# Patient Record
Sex: FEMALE | Race: WHITE | NOT HISPANIC OR LATINO | Employment: OTHER | URBAN - METROPOLITAN AREA
[De-identification: names, ages, dates, MRNs, and addresses within clinical notes are randomized per-mention and may not be internally consistent; named-entity substitution may affect disease eponyms.]

---

## 2019-03-14 PROBLEM — C50.411: Status: ACTIVE | Noted: 2019-03-14

## 2019-10-08 DIAGNOSIS — E11.42 DIABETIC POLYNEUROPATHY ASSOCIATED WITH TYPE 2 DIABETES MELLITUS: Primary | ICD-10-CM

## 2019-10-08 RX ORDER — INSULIN GLARGINE 100 [IU]/ML
65 INJECTION, SOLUTION SUBCUTANEOUS NIGHTLY
Qty: 2 ML | Refills: 1 | Status: SHIPPED | OUTPATIENT
Start: 2019-10-08 | End: 2019-10-14 | Stop reason: SDUPTHER

## 2019-10-08 NOTE — TELEPHONE ENCOUNTER
----- Message from Danny Castaneda sent at 10/8/2019 10:57 AM CDT -----  Refills lantus   [facility]   Pt [phone]

## 2019-10-14 DIAGNOSIS — E11.42 DIABETIC POLYNEUROPATHY ASSOCIATED WITH TYPE 2 DIABETES MELLITUS: ICD-10-CM

## 2019-10-14 RX ORDER — INSULIN GLARGINE 100 [IU]/ML
65 INJECTION, SOLUTION SUBCUTANEOUS NIGHTLY
Qty: 5850 ML | Refills: 1 | Status: SHIPPED | OUTPATIENT
Start: 2019-10-14 | End: 2019-10-14 | Stop reason: SDUPTHER

## 2019-10-14 RX ORDER — INSULIN GLARGINE 100 [IU]/ML
65 INJECTION, SOLUTION SUBCUTANEOUS NIGHTLY
Qty: 5850 ML | Refills: 1 | Status: SHIPPED | OUTPATIENT
Start: 2019-10-14 | End: 2020-06-29 | Stop reason: SDUPTHER

## 2019-10-14 NOTE — TELEPHONE ENCOUNTER
----- Message from Efraín Monroy sent at 10/14/2019  9:01 AM CDT -----  Contact: Maria T Turpin 100 UNIT/ML Solution Pen Injector  Send to [facility]   Pt# [phone]  Patient says that she called [facility] and they told her that they haven't received the prescription yet that was sent over last week .

## 2019-11-13 ENCOUNTER — OFFICE VISIT (OUTPATIENT)
Dept: FAMILY MEDICINE | Facility: CLINIC | Age: 74
End: 2019-11-13
Payer: MEDICARE

## 2019-11-13 VITALS
HEIGHT: 65 IN | BODY MASS INDEX: 29.82 KG/M2 | HEART RATE: 72 BPM | SYSTOLIC BLOOD PRESSURE: 158 MMHG | DIASTOLIC BLOOD PRESSURE: 86 MMHG | WEIGHT: 179 LBS

## 2019-11-13 DIAGNOSIS — I10 HYPERTENSION, UNSPECIFIED TYPE: ICD-10-CM

## 2019-11-13 DIAGNOSIS — I48.0 PAROXYSMAL ATRIAL FIBRILLATION: ICD-10-CM

## 2019-11-13 DIAGNOSIS — K21.9 GASTRIC REFLUX: ICD-10-CM

## 2019-11-13 DIAGNOSIS — Z85.3 HISTORY OF BREAST CANCER: ICD-10-CM

## 2019-11-13 DIAGNOSIS — E78.5 HYPERLIPIDEMIA, UNSPECIFIED HYPERLIPIDEMIA TYPE: ICD-10-CM

## 2019-11-13 DIAGNOSIS — E11.69 TYPE 2 DIABETES MELLITUS WITH OTHER SPECIFIED COMPLICATION, UNSPECIFIED WHETHER LONG TERM INSULIN USE: Primary | ICD-10-CM

## 2019-11-13 PROBLEM — I48.91 A-FIB: Status: ACTIVE | Noted: 2019-11-13

## 2019-11-13 LAB — HBA1C MFR BLD: 6.3 %

## 2019-11-13 PROCEDURE — 83036 HEMOGLOBIN GLYCOSYLATED A1C: CPT | Mod: QW,,, | Performed by: NURSE PRACTITIONER

## 2019-11-13 PROCEDURE — 1100F PTFALLS ASSESS-DOCD GE2>/YR: CPT | Mod: S$GLB,,, | Performed by: NURSE PRACTITIONER

## 2019-11-13 PROCEDURE — 3288F FALL RISK ASSESSMENT DOCD: CPT | Mod: S$GLB,,, | Performed by: NURSE PRACTITIONER

## 2019-11-13 PROCEDURE — 3044F HG A1C LEVEL LT 7.0%: CPT | Mod: S$GLB,,, | Performed by: NURSE PRACTITIONER

## 2019-11-13 PROCEDURE — 3288F PR FALLS RISK ASSESSMENT DOCUMENTED: ICD-10-PCS | Mod: S$GLB,,, | Performed by: NURSE PRACTITIONER

## 2019-11-13 PROCEDURE — 1100F PR PT FALLS ASSESS DOC 2+ FALLS/FALL W/INJURY/YR: ICD-10-PCS | Mod: S$GLB,,, | Performed by: NURSE PRACTITIONER

## 2019-11-13 PROCEDURE — 3044F PR MOST RECENT HEMOGLOBIN A1C LEVEL <7.0%: ICD-10-PCS | Mod: S$GLB,,, | Performed by: NURSE PRACTITIONER

## 2019-11-13 PROCEDURE — 83036 POCT HEMOGLOBIN A1C: ICD-10-PCS | Mod: QW,,, | Performed by: NURSE PRACTITIONER

## 2019-11-13 PROCEDURE — 99214 OFFICE O/P EST MOD 30 MIN: CPT | Mod: S$GLB,,, | Performed by: NURSE PRACTITIONER

## 2019-11-13 PROCEDURE — 99214 PR OFFICE/OUTPT VISIT, EST, LEVL IV, 30-39 MIN: ICD-10-PCS | Mod: S$GLB,,, | Performed by: NURSE PRACTITIONER

## 2019-11-13 RX ORDER — FLASH GLUCOSE SENSOR
KIT MISCELLANEOUS
Refills: 0 | COMMUNITY
Start: 2019-08-13 | End: 2020-02-13

## 2019-11-13 RX ORDER — FERROUS SULFATE 325(65) MG
325 TABLET ORAL
COMMUNITY

## 2019-11-13 RX ORDER — MULTIVIT WITH MINERALS/HERBS
1 TABLET ORAL DAILY
COMMUNITY

## 2019-11-13 RX ORDER — VIT C/E/ZN/COPPR/LUTEIN/ZEAXAN 250MG-90MG
CAPSULE ORAL
COMMUNITY

## 2019-11-13 RX ORDER — SYRINGE-NEEDLE,INSULIN,0.5 ML 30 GX5/16"
SYRINGE, EMPTY DISPOSABLE MISCELLANEOUS
COMMUNITY
Start: 2019-09-20

## 2019-11-13 RX ORDER — ESCITALOPRAM OXALATE 10 MG/1
10 TABLET ORAL DAILY
COMMUNITY
Start: 2019-10-23 | End: 2020-01-23 | Stop reason: SDUPTHER

## 2019-11-13 RX ORDER — TIMOLOL MALEATE 5 MG/ML
SOLUTION/ DROPS OPHTHALMIC
COMMUNITY
Start: 2019-10-07 | End: 2020-04-20

## 2019-11-13 NOTE — PROGRESS NOTES
SUBJECTIVE:    Patient ID: Maria T Lund is a 74 y.o. female.    Chief Complaint: Follow-up (SW)    Presents for check up. Recently returned from cruise. Had fall in airport. Did not lose consciousness. Did have significant bruising to elbow. But feels ok now. Had sleep study performed has not received results. Followed regularly by DR. Cortes and Dr. Scruggs. Recent Mri showed old possible stroke to right side. Currently in physical therapy. Feels good otherwise. Using cane for ambulation      Office Visit on 11/13/2019   Component Date Value Ref Range Status    Hemoglobin A1C 11/13/2019 6.3  % Final       Past Medical History:   Diagnosis Date    A-fib     Anticoagulant long-term use     Arthritis     Breast cancer 11/2018    right breast    Diabetes mellitus     Gastric reflux     Glaucoma     Hyperlipemia     Hypertension      Past Surgical History:   Procedure Laterality Date    APPENDECTOMY      AV NODE ABLATION      x 2    CATARACT EXTRACTION Right     CHOLECYSTECTOMY      FOOT SURGERY Right     Achilles tendon replacement    HYSTERECTOMY      SENTINEL LYMPH NODE BIOPSY Right 3/14/2019    Procedure: BIOPSY, LYMPH NODE, SENTINEL;  Surgeon: Lisa Youssef MD;  Location: [facility];  Service: General;  Laterality: Right;    TONSILLECTOMY      WISDOM TOOTH EXTRACTION       Family History   Problem Relation Age of Onset    Heart disease Mother     Breast cancer Sister         age unknown    Breast cancer Daughter 47       Marital Status:   Alcohol History:  reports that she does not drink alcohol.  Tobacco History:  reports that she has never smoked. She has never used smokeless tobacco.  Drug History:  reports that she does not use drugs.    Review of patient's allergies indicates:   Allergen Reactions    Adhesive Rash       Current Outpatient Medications:     aspirin (ECOTRIN) 81 MG EC tablet, Take 1 tablet (81 mg total) by mouth once daily. HOLDING FOR 4 DAYS  "PRIOR TO SURGERY, Disp: , Rfl: 0    b complex vitamins tablet, Take 1 tablet by mouth once daily., Disp: , Rfl:     canagliflozin (INVOKANA) 300 mg Tab tablet, Take 300 mg by mouth once daily. HOLD 2 DAYS PRIOR TO SURGERY.LAST DOSE 3/11, Disp: , Rfl:     cholecalciferol, vitamin D3, (VITAMIN D3) 1,000 unit capsule, 1 capsule, Disp: , Rfl:     escitalopram oxalate (LEXAPRO) 10 MG tablet, Take 10 mg by mouth once daily., Disp: , Rfl:     ferrous sulfate (IRON) 325 mg (65 mg iron) Tab tablet, Take 325 mg by mouth daily with breakfast., Disp: , Rfl:     flash glucose scanning reader (FREESTYLE PALOMA 14 DAY READER) Misc, as directed, Disp: , Rfl:     flash glucose sensor (FREESTYLE PALOMA 14 DAY SENSOR) Kit, as directed, Disp: , Rfl:     flecainide (TAMBOCOR) 100 MG Tab, Take 100 mg by mouth every 12 (twelve) hours. Take morning of surgery, Disp: , Rfl:     FLUZONE HIGH-DOSE 2019-20, PF, 180 mcg/0.5 mL Syrg, ADMINISTER 0.5ML IN THE MUSCLE AS DIRECTED, Disp: , Rfl: 0    FREESTYLE PALOMA 14 DAY SENSOR Kit, USE SUBCUTANEOUSLY AS DIRECTED, Disp: , Rfl: 0    insulin glargine (LANTUS) 100 unit/mL injection, Inject 65 Units into the skin every evening., Disp: 5850 mL, Rfl: 1    Lactobac no.41/Bifidobact no.7 (PROBIOTIC-10 ORAL), Take by mouth., Disp: , Rfl:     latanoprost 0.005 % ophthalmic solution, Place 1 drop into both eyes every evening. Take night before surgery, Disp: , Rfl:     losartan (COZAAR) 50 MG tablet, Take 50 mg by mouth once daily. Hold morning of surgery, Disp: , Rfl:     magnesium-calcium-folic acid 400-200-1 mg Tab, Take by mouth., Disp: , Rfl:     metoprolol tartrate (LOPRESSOR) 25 MG tablet, Take 12.5 mg by mouth once daily. Take morning of surgery, Disp: , Rfl:     PEN NEEDLE 31 gauge x 5/16" Ndle, , Disp: , Rfl:     pravastatin (PRAVACHOL) 40 MG tablet, Take 40 mg by mouth every evening. Take night before surgery and take an additional dose Pravastatin 40mg morning of surgery, Disp: , " "Rfl:     RABEprazole (ACIPHEX) 20 mg tablet, Take 20 mg by mouth once daily. Morning of surgery, Disp: , Rfl:     rivaroxaban (XARELTO) 20 mg Tab, Take 20 mg by mouth daily with dinner or evening meal. Hold per MD instructions, Disp: , Rfl:     timolol (BETIMOL) 0.5 % ophthalmic solution, Place 1 drop into both eyes once daily. Take morning of surgery, Disp: , Rfl:     timolol maleate 0.5% (TIMOPTIC) 0.5 % Drop, , Disp: , Rfl:     Review of Systems   Constitutional: Negative for chills, fever and unexpected weight change.   HENT: Negative for ear pain, rhinorrhea and sore throat.    Eyes: Negative for pain and visual disturbance.   Respiratory: Negative for cough and shortness of breath.    Cardiovascular: Negative for chest pain, palpitations and leg swelling.   Gastrointestinal: Negative for abdominal pain, diarrhea, nausea and vomiting.   Genitourinary: Negative for difficulty urinating, hematuria and vaginal bleeding.   Musculoskeletal: Negative for arthralgias.   Skin: Negative for rash.   Neurological: Negative for dizziness, weakness and headaches.   Psychiatric/Behavioral: Negative for agitation and sleep disturbance. The patient is not nervous/anxious.           Objective:      Vitals:    11/13/19 0956   BP: (!) 158/86   Pulse: 72   Weight: 81.2 kg (179 lb)   Height: 5' 4.5" (1.638 m)     Body mass index is 30.25 kg/m².  Physical Exam   Constitutional: She is oriented to person, place, and time. She appears well-developed and well-nourished.   Cane for ambulation   HENT:   Head: Normocephalic and atraumatic.   Right Ear: External ear normal.   Left Ear: External ear normal.   Eyes: Pupils are equal, round, and reactive to light. EOM are normal.   Neck: Normal range of motion. Neck supple.   Cardiovascular: Normal rate, regular rhythm and normal heart sounds.   Pulses:       Dorsalis pedis pulses are 2+ on the right side.        Posterior tibial pulses are 2+ on the right side.   Pulmonary/Chest: Effort " normal and breath sounds normal.   Abdominal: Soft. Bowel sounds are normal.   Musculoskeletal: Normal range of motion. She exhibits no edema or deformity.        Right foot: There is normal range of motion and no deformity.   Feet:   Right Foot:   Protective Sensation: 5 sites tested. 5 sites sensed.   Left Foot:   Protective Sensation: 5 sites tested. 5 sites sensed.   Skin Integrity: Negative for skin breakdown.   Lymphadenopathy:     She has no cervical adenopathy.   Neurological: She is alert and oriented to person, place, and time.   Skin: Skin is warm and dry.   Psychiatric: She has a normal mood and affect. Her behavior is normal.         Assessment:       1. Type 2 diabetes mellitus with other specified complication, unspecified whether long term insulin use    2. Hypertension, unspecified type    3. Paroxysmal atrial fibrillation    4. Hyperlipidemia, unspecified hyperlipidemia type    5. History of breast cancer    6. Gastric reflux         Plan:       Type 2 diabetes mellitus with other specified complication, unspecified whether long term insulin use  Comments:  ok to stop invokana  Orders:  -     Hemoglobin A1C, POCT    Hypertension, unspecified type    Paroxysmal atrial fibrillation    Hyperlipidemia, unspecified hyperlipidemia type    History of breast cancer    Gastric reflux      Follow up in about 3 months (around 2/13/2020) for Diabetic Check-Up.

## 2020-01-22 NOTE — TELEPHONE ENCOUNTER
----- Message from Marj Telles sent at 1/22/2020  3:03 PM CST -----  Refill for Lexapro, Losartan, Rabeprazole. [place] [facility]. Pt #[phone]

## 2020-01-23 RX ORDER — ESCITALOPRAM OXALATE 10 MG/1
10 TABLET ORAL DAILY
Qty: 90 TABLET | Refills: 0 | Status: SHIPPED | OUTPATIENT
Start: 2020-01-23 | End: 2020-04-22 | Stop reason: SDUPTHER

## 2020-01-23 RX ORDER — RABEPRAZOLE SODIUM 20 MG/1
20 TABLET, DELAYED RELEASE ORAL DAILY
Qty: 90 TABLET | Refills: 0 | Status: SHIPPED | OUTPATIENT
Start: 2020-01-23 | End: 2020-04-22 | Stop reason: SDUPTHER

## 2020-01-23 RX ORDER — LOSARTAN POTASSIUM 50 MG/1
50 TABLET ORAL DAILY
Qty: 90 TABLET | Refills: 0 | Status: SHIPPED | OUTPATIENT
Start: 2020-01-23 | End: 2020-04-22 | Stop reason: SDUPTHER

## 2020-02-13 ENCOUNTER — OFFICE VISIT (OUTPATIENT)
Dept: FAMILY MEDICINE | Facility: CLINIC | Age: 75
End: 2020-02-13
Payer: MEDICARE

## 2020-02-13 VITALS
BODY MASS INDEX: 30.32 KG/M2 | WEIGHT: 182 LBS | SYSTOLIC BLOOD PRESSURE: 120 MMHG | HEART RATE: 62 BPM | HEIGHT: 65 IN | DIASTOLIC BLOOD PRESSURE: 84 MMHG

## 2020-02-13 DIAGNOSIS — I10 HYPERTENSION, UNSPECIFIED TYPE: ICD-10-CM

## 2020-02-13 DIAGNOSIS — I48.0 PAROXYSMAL ATRIAL FIBRILLATION: ICD-10-CM

## 2020-02-13 DIAGNOSIS — K21.9 GASTRIC REFLUX: ICD-10-CM

## 2020-02-13 DIAGNOSIS — F32.A DEPRESSION, UNSPECIFIED DEPRESSION TYPE: ICD-10-CM

## 2020-02-13 DIAGNOSIS — L40.9 PSORIASIS: ICD-10-CM

## 2020-02-13 DIAGNOSIS — E78.5 HYPERLIPIDEMIA, UNSPECIFIED HYPERLIPIDEMIA TYPE: ICD-10-CM

## 2020-02-13 DIAGNOSIS — Z85.3 HISTORY OF MALIGNANT NEOPLASM OF BREAST: ICD-10-CM

## 2020-02-13 DIAGNOSIS — E11.69 TYPE 2 DIABETES MELLITUS WITH OTHER SPECIFIED COMPLICATION, UNSPECIFIED WHETHER LONG TERM INSULIN USE: Primary | ICD-10-CM

## 2020-02-13 LAB — HBA1C MFR BLD: 7.5 %

## 2020-02-13 PROCEDURE — 3074F PR MOST RECENT SYSTOLIC BLOOD PRESSURE < 130 MM HG: ICD-10-PCS | Mod: S$GLB,,, | Performed by: NURSE PRACTITIONER

## 2020-02-13 PROCEDURE — 99214 OFFICE O/P EST MOD 30 MIN: CPT | Mod: S$GLB,,, | Performed by: NURSE PRACTITIONER

## 2020-02-13 PROCEDURE — 99214 PR OFFICE/OUTPT VISIT, EST, LEVL IV, 30-39 MIN: ICD-10-PCS | Mod: S$GLB,,, | Performed by: NURSE PRACTITIONER

## 2020-02-13 PROCEDURE — 1159F MED LIST DOCD IN RCRD: CPT | Mod: S$GLB,,, | Performed by: NURSE PRACTITIONER

## 2020-02-13 PROCEDURE — 1101F PT FALLS ASSESS-DOCD LE1/YR: CPT | Mod: S$GLB,,, | Performed by: NURSE PRACTITIONER

## 2020-02-13 PROCEDURE — 1159F PR MEDICATION LIST DOCUMENTED IN MEDICAL RECORD: ICD-10-PCS | Mod: S$GLB,,, | Performed by: NURSE PRACTITIONER

## 2020-02-13 PROCEDURE — 3051F HG A1C>EQUAL 7.0%<8.0%: CPT | Mod: S$GLB,,, | Performed by: NURSE PRACTITIONER

## 2020-02-13 PROCEDURE — 3074F SYST BP LT 130 MM HG: CPT | Mod: S$GLB,,, | Performed by: NURSE PRACTITIONER

## 2020-02-13 PROCEDURE — 3079F DIAST BP 80-89 MM HG: CPT | Mod: S$GLB,,, | Performed by: NURSE PRACTITIONER

## 2020-02-13 PROCEDURE — 83036 HEMOGLOBIN GLYCOSYLATED A1C: CPT | Mod: QW,,, | Performed by: NURSE PRACTITIONER

## 2020-02-13 PROCEDURE — 3051F PR MOST RECENT HEMOGLOBIN A1C LEVEL 7.0 - < 8.0%: ICD-10-PCS | Mod: S$GLB,,, | Performed by: NURSE PRACTITIONER

## 2020-02-13 PROCEDURE — 1101F PR PT FALLS ASSESS DOC 0-1 FALLS W/OUT INJ PAST YR: ICD-10-PCS | Mod: S$GLB,,, | Performed by: NURSE PRACTITIONER

## 2020-02-13 PROCEDURE — 3079F PR MOST RECENT DIASTOLIC BLOOD PRESSURE 80-89 MM HG: ICD-10-PCS | Mod: S$GLB,,, | Performed by: NURSE PRACTITIONER

## 2020-02-13 PROCEDURE — 83036 POCT HEMOGLOBIN A1C: ICD-10-PCS | Mod: QW,,, | Performed by: NURSE PRACTITIONER

## 2020-02-13 RX ORDER — LETROZOLE 2.5 MG/1
2.5 TABLET, FILM COATED ORAL DAILY
COMMUNITY
Start: 2020-02-11

## 2020-02-13 RX ORDER — CLOBETASOL PROPIONATE 0.46 MG/ML
SOLUTION TOPICAL
COMMUNITY
Start: 2020-01-09 | End: 2020-07-16

## 2020-02-13 RX ORDER — PREDNISONE 10 MG/1
TABLET ORAL
COMMUNITY
Start: 2020-02-10 | End: 2020-04-20

## 2020-02-13 NOTE — PROGRESS NOTES
SUBJECTIVE:    Patient ID: Maria T Lund is a 75 y.o. female.    Chief Complaint: Follow-up (3 month//brought bottles tb// A1c 7.5)    75 year old female presents for check up. Reports that has been seeing Dr. Cortes in [place]. Recently had ct scan. It did show nodes on lungs per patient. She plans to just monitor for now. Recently started on steroids for scalp psoriasis. Thinks it is helping. Blood sugars have been up and down. Decreased dose of lantus due to blood sugars dropping. However has not been eating healthy and sugars have been in creasing      Office Visit on 02/13/2020   Component Date Value Ref Range Status    Hemoglobin A1C 02/13/2020 7.5  % Final    WBC 02/13/2020 8.3  3.8 - 10.8 Thousand/uL Final    RBC 02/13/2020 5.11* 3.80 - 5.10 Million/uL Final    Hemoglobin 02/13/2020 14.4  11.7 - 15.5 g/dL Final    Hematocrit 02/13/2020 43.9  35.0 - 45.0 % Final    Mean Corpuscular Volume 02/13/2020 85.9  80.0 - 100.0 fL Final    Mean Corpuscular Hemoglobin 02/13/2020 28.2  27.0 - 33.0 pg Final    Mean Corpuscular Hemoglobin Conc 02/13/2020 32.8  32.0 - 36.0 g/dL Final    RDW 02/13/2020 13.8  11.0 - 15.0 % Final    Platelets 02/13/2020 277  140 - 400 Thousand/uL Final    MPV 02/13/2020 10.0  7.5 - 12.5 fL Final    Neutrophils Absolute 02/13/2020 6,864  1,500 - 7,800 cells/uL Final    Lymph # 02/13/2020 1,004  850 - 3,900 cells/uL Final    Mono # 02/13/2020 340  200 - 950 cells/uL Final    Eos # 02/13/2020 58  15 - 500 cells/uL Final    Baso # 02/13/2020 33  0 - 200 cells/uL Final    Neutrophils Relative 02/13/2020 82.7  % Final    Lymph% 02/13/2020 12.1  % Final    Mono% 02/13/2020 4.1  % Final    Eosinophil% 02/13/2020 0.7  % Final    Basophil% 02/13/2020 0.4  % Final    Glucose 02/13/2020 257* 65 - 99 mg/dL Final    BUN, Bld 02/13/2020 21  7 - 25 mg/dL Final    Creatinine 02/13/2020 0.80  0.60 - 0.93 mg/dL Final    eGFR if non African American 02/13/2020 73  > OR =  60 mL/min/1.73m2 Final    eGFR if African American 02/13/2020 84  > OR = 60 mL/min/1.73m2 Final    BUN/Creatinine Ratio 02/13/2020 NOT APPLICABLE  6 - 22 (calc) Final    Sodium 02/13/2020 137  135 - 146 mmol/L Final    Potassium 02/13/2020 4.7  3.5 - 5.3 mmol/L Final    Chloride 02/13/2020 98  98 - 110 mmol/L Final    CO2 02/13/2020 29  20 - 32 mmol/L Final    Calcium 02/13/2020 9.8  8.6 - 10.4 mg/dL Final    Total Protein 02/13/2020 7.6  6.1 - 8.1 g/dL Final    Albumin 02/13/2020 4.7  3.6 - 5.1 g/dL Final    Globulin, Total 02/13/2020 2.9  1.9 - 3.7 g/dL (calc) Final    Albumin/Globulin Ratio 02/13/2020 1.6  1.0 - 2.5 (calc) Final    Total Bilirubin 02/13/2020 1.3* 0.2 - 1.2 mg/dL Final    Alkaline Phosphatase 02/13/2020 60  37 - 153 U/L Final    AST 02/13/2020 14  10 - 35 U/L Final    ALT 02/13/2020 15  6 - 29 U/L Final    Cholesterol 02/13/2020 164  <200 mg/dL Final    HDL 02/13/2020 56  > OR = 50 mg/dL Final    Triglycerides 02/13/2020 111  <150 mg/dL Final    LDL Cholesterol 02/13/2020 87  mg/dL (calc) Final    Hdl/Cholesterol Ratio 02/13/2020 2.9  <5.0 (calc) Final    Non HDL Chol. (LDL+VLDL) 02/13/2020 108  <130 mg/dL (calc) Final    TSH w/reflex to FT4 02/13/2020 2.26  0.40 - 4.50 mIU/L Final    Creatinine, Random Ur 02/13/2020 159  20 - 275 mg/dL Final    Microalb, Ur 02/13/2020 22.7  See Note: mg/dL Final    Microalb Creat Ratio 02/13/2020 143* <30 mcg/mg creat Final    Color, UA 02/13/2020 DARK YELLOW  YELLOW Final    Appearance, UA 02/13/2020 CLEAR  CLEAR Final    Specific [place], UA 02/13/2020 1.026  1.001 - 1.035 Final    pH, UA 02/13/2020 < OR = 5.0  5.0 - 8.0 Final    Glucose, UA 02/13/2020 TRACE* NEGATIVE Final    Bilirubin, UA 02/13/2020 NEGATIVE  NEGATIVE Final    Ketones, UA 02/13/2020 TRACE* NEGATIVE Final    Occult Blood UA 02/13/2020 NEGATIVE  NEGATIVE Final    Protein, UA 02/13/2020 1+* NEGATIVE Final    Nitrite, UA 02/13/2020 NEGATIVE  NEGATIVE Final     Leukocytes, UA 02/13/2020 TRACE* NEGATIVE Final    WBC Casts, UA 02/13/2020 0-5  < OR = 5 /HPF Final    RBC Casts, UA 02/13/2020 0-2  < OR = 2 /HPF Final    Squam Epithel, UA 02/13/2020 0-5  < OR = 5 /HPF Final    Bacteria, UA 02/13/2020 NONE SEEN  NONE SEEN /HPF Final    Hyaline Casts, UA 02/13/2020 NONE SEEN  NONE SEEN /LPF Final    Reflexive Urine Culture 02/13/2020 CULTURE INDICATED - RESULTS TO FOLLOW   Final    Urine Culture, Routine 02/13/2020    Final   Office Visit on 11/13/2019   Component Date Value Ref Range Status    Hemoglobin A1C 11/13/2019 6.3  % Final       Past Medical History:   Diagnosis Date    A-fib     Anticoagulant long-term use     Arthritis     Breast cancer 11/2018    right breast    Diabetes mellitus     Gastric reflux     Glaucoma     Hyperlipemia     Hypertension      Past Surgical History:   Procedure Laterality Date    APPENDECTOMY      AV NODE ABLATION      x 2    CATARACT EXTRACTION Right     CHOLECYSTECTOMY      FOOT SURGERY Right     Achilles tendon replacement    HYSTERECTOMY      SENTINEL LYMPH NODE BIOPSY Right 3/14/2019    Procedure: BIOPSY, LYMPH NODE, SENTINEL;  Surgeon: Lisa Youssef MD;  Location: [facility];  Service: General;  Laterality: Right;    TONSILLECTOMY      WISDOM TOOTH EXTRACTION       Family History   Problem Relation Age of Onset    Heart disease Mother     Breast cancer Sister         age unknown    Breast cancer Daughter 47       Marital Status:   Alcohol History:  reports that she does not drink alcohol.  Tobacco History:  reports that she has never smoked. She has never used smokeless tobacco.  Drug History:  reports that she does not use drugs.    Review of patient's allergies indicates:   Allergen Reactions    Adhesive Rash       Current Outpatient Medications:     aspirin (ECOTRIN) 81 MG EC tablet, Take 1 tablet (81 mg total) by mouth once daily. HOLDING FOR 4 DAYS PRIOR TO SURGERY, Disp: , Rfl: 0    b  "complex vitamins tablet, Take 1 tablet by mouth once daily., Disp: , Rfl:     cholecalciferol, vitamin D3, (VITAMIN D3) 1,000 unit capsule, 1 capsule, Disp: , Rfl:     clobetasoL (TEMOVATE) 0.05 % external solution, , Disp: , Rfl:     escitalopram oxalate (LEXAPRO) 10 MG tablet, Take 1 tablet (10 mg total) by mouth once daily., Disp: 90 tablet, Rfl: 0    ferrous sulfate (IRON) 325 mg (65 mg iron) Tab tablet, Take 325 mg by mouth daily with breakfast., Disp: , Rfl:     flash glucose scanning reader (FREESTYLE PALOMA 14 DAY READER) Misc, as directed, Disp: 1 each, Rfl: 3    flash glucose sensor (FREESTYLE PALOMA 14 DAY SENSOR) Kit, as directed, Disp: 1 kit, Rfl: 1    flecainide (TAMBOCOR) 100 MG Tab, Take 100 mg by mouth every 12 (twelve) hours. Take morning of surgery, Disp: , Rfl:     insulin glargine (LANTUS) 100 unit/mL injection, Inject 65 Units into the skin every evening., Disp: 5850 mL, Rfl: 1    Lactobac no.41/Bifidobact no.7 (PROBIOTIC-10 ORAL), Take by mouth., Disp: , Rfl:     latanoprost 0.005 % ophthalmic solution, Place 1 drop into both eyes every evening. Take night before surgery, Disp: , Rfl:     letrozole (FEMARA) 2.5 mg Tab, Take 2.5 mg by mouth once daily ., Disp: , Rfl:     losartan (COZAAR) 50 MG tablet, Take 1 tablet (50 mg total) by mouth once daily. Hold morning of surgery, Disp: 90 tablet, Rfl: 0    magnesium-calcium-folic acid 400-200-1 mg Tab, Take by mouth., Disp: , Rfl:     metoprolol tartrate (LOPRESSOR) 25 MG tablet, Take 12.5 mg by mouth once daily. Take morning of surgery, Disp: , Rfl:     PEN NEEDLE 31 gauge x 5/16" Ndle, , Disp: , Rfl:     pravastatin (PRAVACHOL) 40 MG tablet, Take 40 mg by mouth every evening. Take night before surgery and take an additional dose Pravastatin 40mg morning of surgery, Disp: , Rfl:     predniSONE (DELTASONE) 10 MG tablet, , Disp: , Rfl:     RABEprazole (ACIPHEX) 20 mg tablet, Take 1 tablet (20 mg total) by mouth once daily. Morning of " "surgery, Disp: 90 tablet, Rfl: 0    rivaroxaban (XARELTO) 20 mg Tab, Take 20 mg by mouth daily with dinner or evening meal. Hold per MD instructions, Disp: , Rfl:     timolol maleate 0.5% (TIMOPTIC) 0.5 % Drop, , Disp: , Rfl:     timolol (BETIMOL) 0.5 % ophthalmic solution, Place 1 drop into both eyes once daily. Take morning of surgery, Disp: , Rfl:     Review of Systems   Constitutional: Negative for chills, fever and unexpected weight change.   HENT: Negative for ear pain, rhinorrhea and sore throat.    Eyes: Negative for pain and visual disturbance.   Respiratory: Negative for cough and shortness of breath.    Cardiovascular: Negative for chest pain, palpitations and leg swelling.   Gastrointestinal: Negative for abdominal pain, diarrhea, nausea and vomiting.   Genitourinary: Negative for difficulty urinating, hematuria and vaginal bleeding.   Musculoskeletal: Negative for arthralgias.   Skin: Negative for rash.   Neurological: Negative for dizziness, weakness and headaches.   Psychiatric/Behavioral: Negative for agitation and sleep disturbance. The patient is not nervous/anxious.           Objective:      Vitals:    02/13/20 0943   BP: 120/84   Pulse: 62   Weight: 82.6 kg (182 lb)   Height: 5' 4.5" (1.638 m)     Body mass index is 30.76 kg/m².  Physical Exam   Constitutional: She is oriented to person, place, and time. She appears well-developed and well-nourished.   HENT:   Right Ear: External ear normal.   Left Ear: External ear normal.   Neck: Normal range of motion. Neck supple.   Cardiovascular: Normal rate, regular rhythm and normal heart sounds.   Pulses:       Dorsalis pedis pulses are 2+ on the right side.        Posterior tibial pulses are 2+ on the right side.   Pulmonary/Chest: Effort normal and breath sounds normal.   Abdominal: Soft. Bowel sounds are normal.   Musculoskeletal: Normal range of motion. She exhibits no edema or deformity.        Right foot: There is normal range of motion and no " deformity.   Feet:   Right Foot:   Protective Sensation: 5 sites tested. 5 sites sensed.   Left Foot:   Protective Sensation: 5 sites tested. 5 sites sensed.   Skin Integrity: Negative for skin breakdown.   Lymphadenopathy:     She has no cervical adenopathy.   Neurological: She is alert and oriented to person, place, and time.   Skin: Skin is warm and dry.   Psychiatric: She has a normal mood and affect. Her behavior is normal.         Assessment:       1. Type 2 diabetes mellitus with other specified complication, unspecified whether long term insulin use    2. Hypertension, unspecified type    3. Paroxysmal atrial fibrillation    4. Hyperlipidemia, unspecified hyperlipidemia type    5. Gastric reflux    6. Psoriasis    7. Depression, unspecified depression type    8. History of malignant neoplasm of breast         Plan:       Type 2 diabetes mellitus with other specified complication, unspecified whether long term insulin use  -     POCT HEMOGLOBIN A1C  -     Discontinue: flash glucose scanning reader (FREESTYLE PALOMA 14 DAY READER) Misc; as directed  Dispense: 1 each; Refill: 3  -     Discontinue: flash glucose sensor (FREESTYLE PALOMA 14 DAY SENSOR) Kit; as directed  Dispense: 1 kit; Refill: 1  -     CBC auto differential; Future; Expected date: 02/13/2020  -     Comprehensive metabolic panel; Future; Expected date: 02/13/2020  -     Lipid panel; Future; Expected date: 02/13/2020  -     TSH w/reflex to FT4; Future; Expected date: 02/13/2020  -     Microalbumin/creatinine urine ratio; Future; Expected date: 02/13/2020  -     Urinalysis, Reflex to Urine Culture Urine, Clean Catch; Future; Expected date: 02/13/2020  -     flash glucose sensor (FREESTYLE PALOMA 14 DAY SENSOR) Kit; as directed  Dispense: 1 kit; Refill: 1  -     flash glucose scanning reader (FREESTYLE PALOMA 14 DAY READER) Misc; as directed  Dispense: 1 each; Refill: 3    Hypertension, unspecified type  -     CBC auto differential; Future; Expected  date: 02/13/2020  -     Comprehensive metabolic panel; Future; Expected date: 02/13/2020  -     Lipid panel; Future; Expected date: 02/13/2020    Paroxysmal atrial fibrillation    Hyperlipidemia, unspecified hyperlipidemia type  -     Lipid panel; Future; Expected date: 02/13/2020    Gastric reflux    Psoriasis    Depression, unspecified depression type    History of malignant neoplasm of breast    Other orders  -     Urinalysis, Reflex to Urine Culture  -     Urine culture      Follow up in about 3 months (around 5/13/2020) for medication management, Diabetic Check-Up.

## 2020-02-15 LAB
ALBUMIN SERPL-MCNC: 4.7 G/DL (ref 3.6–5.1)
ALBUMIN/CREAT UR: 143 MCG/MG CREAT
ALBUMIN/GLOB SERPL: 1.6 (CALC) (ref 1–2.5)
ALP SERPL-CCNC: 60 U/L (ref 37–153)
ALT SERPL-CCNC: 15 U/L (ref 6–29)
APPEARANCE UR: CLEAR
AST SERPL-CCNC: 14 U/L (ref 10–35)
BACTERIA #/AREA URNS HPF: ABNORMAL /HPF
BACTERIA UR CULT: ABNORMAL
BACTERIA UR CULT: NORMAL
BASOPHILS # BLD AUTO: 33 CELLS/UL (ref 0–200)
BASOPHILS NFR BLD AUTO: 0.4 %
BILIRUB SERPL-MCNC: 1.3 MG/DL (ref 0.2–1.2)
BILIRUB UR QL STRIP: NEGATIVE
BUN SERPL-MCNC: 21 MG/DL (ref 7–25)
BUN/CREAT SERPL: ABNORMAL (CALC) (ref 6–22)
CALCIUM SERPL-MCNC: 9.8 MG/DL (ref 8.6–10.4)
CHLORIDE SERPL-SCNC: 98 MMOL/L (ref 98–110)
CHOLEST SERPL-MCNC: 164 MG/DL
CHOLEST/HDLC SERPL: 2.9 (CALC)
CO2 SERPL-SCNC: 29 MMOL/L (ref 20–32)
COLOR UR: ABNORMAL
CREAT SERPL-MCNC: 0.8 MG/DL (ref 0.6–0.93)
CREAT UR-MCNC: 159 MG/DL (ref 20–275)
EOSINOPHIL # BLD AUTO: 58 CELLS/UL (ref 15–500)
EOSINOPHIL NFR BLD AUTO: 0.7 %
ERYTHROCYTE [DISTWIDTH] IN BLOOD BY AUTOMATED COUNT: 13.8 % (ref 11–15)
GFRSERPLBLD MDRD-ARVRAT: 73 ML/MIN/1.73M2
GLOBULIN SER CALC-MCNC: 2.9 G/DL (CALC) (ref 1.9–3.7)
GLUCOSE SERPL-MCNC: 257 MG/DL (ref 65–99)
GLUCOSE UR QL STRIP: ABNORMAL
HCT VFR BLD AUTO: 43.9 % (ref 35–45)
HDLC SERPL-MCNC: 56 MG/DL
HGB BLD-MCNC: 14.4 G/DL (ref 11.7–15.5)
HGB UR QL STRIP: NEGATIVE
HYALINE CASTS #/AREA URNS LPF: ABNORMAL /LPF
KETONES UR QL STRIP: ABNORMAL
LDLC SERPL CALC-MCNC: 87 MG/DL (CALC)
LEUKOCYTE ESTERASE UR QL STRIP: ABNORMAL
LYMPHOCYTES # BLD AUTO: 1004 CELLS/UL (ref 850–3900)
LYMPHOCYTES NFR BLD AUTO: 12.1 %
MCH RBC QN AUTO: 28.2 PG (ref 27–33)
MCHC RBC AUTO-ENTMCNC: 32.8 G/DL (ref 32–36)
MCV RBC AUTO: 85.9 FL (ref 80–100)
MICROALBUMIN UR-MCNC: 22.7 MG/DL
MONOCYTES # BLD AUTO: 340 CELLS/UL (ref 200–950)
MONOCYTES NFR BLD AUTO: 4.1 %
NEUTROPHILS # BLD AUTO: 6864 CELLS/UL (ref 1500–7800)
NEUTROPHILS NFR BLD AUTO: 82.7 %
NITRITE UR QL STRIP: NEGATIVE
NONHDLC SERPL-MCNC: 108 MG/DL (CALC)
PH UR STRIP: ABNORMAL [PH] (ref 5–8)
PLATELET # BLD AUTO: 277 THOUSAND/UL (ref 140–400)
PMV BLD REES-ECKER: 10 FL (ref 7.5–12.5)
POTASSIUM SERPL-SCNC: 4.7 MMOL/L (ref 3.5–5.3)
PROT SERPL-MCNC: 7.6 G/DL (ref 6.1–8.1)
PROT UR QL STRIP: ABNORMAL
RBC # BLD AUTO: 5.11 MILLION/UL (ref 3.8–5.1)
RBC #/AREA URNS HPF: ABNORMAL /HPF
SODIUM SERPL-SCNC: 137 MMOL/L (ref 135–146)
SP GR UR STRIP: 1.03 (ref 1–1.03)
SQUAMOUS #/AREA URNS HPF: ABNORMAL /HPF
TRIGL SERPL-MCNC: 111 MG/DL
TSH SERPL-ACNC: 2.26 MIU/L (ref 0.4–4.5)
WBC # BLD AUTO: 8.3 THOUSAND/UL (ref 3.8–10.8)
WBC #/AREA URNS HPF: ABNORMAL /HPF

## 2020-02-16 PROBLEM — Z85.3 HISTORY OF MALIGNANT NEOPLASM OF BREAST: Status: ACTIVE | Noted: 2019-08-13

## 2020-02-17 ENCOUNTER — TELEPHONE (OUTPATIENT)
Dept: FAMILY MEDICINE | Facility: CLINIC | Age: 75
End: 2020-02-17

## 2020-03-24 ENCOUNTER — TELEPHONE (OUTPATIENT)
Dept: FAMILY MEDICINE | Facility: CLINIC | Age: 75
End: 2020-03-24

## 2020-03-24 NOTE — PROGRESS NOTES
Spoke to patient with results verbatim per Karlee. Verbalized understanding. Also, encouraged patient to log onto portal. Walked her through the steps on her computer to get activated.

## 2020-03-24 NOTE — TELEPHONE ENCOUNTER
----- Message from Karlee Encarnacion NP sent at 3/24/2020  2:49 PM CDT -----  Sugar is elevated. Continue to work on diet. The rest of your labs are stable.

## 2020-04-20 ENCOUNTER — TELEPHONE (OUTPATIENT)
Dept: FAMILY MEDICINE | Facility: CLINIC | Age: 75
End: 2020-04-20

## 2020-04-20 NOTE — TELEPHONE ENCOUNTER
Please try to move her appt up with EL and switch it to a VV for refills. Put her on Wed or Thursday with EL

## 2020-04-20 NOTE — TELEPHONE ENCOUNTER
----- Message from Marj Telles sent at 4/20/2020  3:26 PM CDT -----  Refill for Losartan, escitalopram oxalate, aciphex.  [place] [facility]. Pt #[phone]

## 2020-04-22 ENCOUNTER — OFFICE VISIT (OUTPATIENT)
Dept: FAMILY MEDICINE | Facility: CLINIC | Age: 75
End: 2020-04-22
Payer: MEDICARE

## 2020-04-22 DIAGNOSIS — Z85.3 HISTORY OF MALIGNANT NEOPLASM OF BREAST: ICD-10-CM

## 2020-04-22 DIAGNOSIS — I48.92 ATRIAL FLUTTER, UNSPECIFIED TYPE: ICD-10-CM

## 2020-04-22 DIAGNOSIS — E11.69 TYPE 2 DIABETES MELLITUS WITH OTHER SPECIFIED COMPLICATION, UNSPECIFIED WHETHER LONG TERM INSULIN USE: ICD-10-CM

## 2020-04-22 DIAGNOSIS — K21.9 GASTROESOPHAGEAL REFLUX DISEASE, ESOPHAGITIS PRESENCE NOT SPECIFIED: Primary | ICD-10-CM

## 2020-04-22 DIAGNOSIS — F32.A DEPRESSION, UNSPECIFIED DEPRESSION TYPE: ICD-10-CM

## 2020-04-22 DIAGNOSIS — E78.5 HYPERLIPIDEMIA, UNSPECIFIED HYPERLIPIDEMIA TYPE: ICD-10-CM

## 2020-04-22 DIAGNOSIS — I10 HYPERTENSION, UNSPECIFIED TYPE: ICD-10-CM

## 2020-04-22 PROCEDURE — 3051F HG A1C>EQUAL 7.0%<8.0%: CPT | Mod: 95,,, | Performed by: NURSE PRACTITIONER

## 2020-04-22 PROCEDURE — 99213 PR OFFICE/OUTPT VISIT, EST, LEVL III, 20-29 MIN: ICD-10-PCS | Mod: 95,,, | Performed by: NURSE PRACTITIONER

## 2020-04-22 PROCEDURE — 3051F PR MOST RECENT HEMOGLOBIN A1C LEVEL 7.0 - < 8.0%: ICD-10-PCS | Mod: 95,,, | Performed by: NURSE PRACTITIONER

## 2020-04-22 PROCEDURE — 1101F PR PT FALLS ASSESS DOC 0-1 FALLS W/OUT INJ PAST YR: ICD-10-PCS | Mod: 95,,, | Performed by: NURSE PRACTITIONER

## 2020-04-22 PROCEDURE — 1159F MED LIST DOCD IN RCRD: CPT | Mod: 95,,, | Performed by: NURSE PRACTITIONER

## 2020-04-22 PROCEDURE — 1101F PT FALLS ASSESS-DOCD LE1/YR: CPT | Mod: 95,,, | Performed by: NURSE PRACTITIONER

## 2020-04-22 PROCEDURE — 1159F PR MEDICATION LIST DOCUMENTED IN MEDICAL RECORD: ICD-10-PCS | Mod: 95,,, | Performed by: NURSE PRACTITIONER

## 2020-04-22 PROCEDURE — 99213 OFFICE O/P EST LOW 20 MIN: CPT | Mod: 95,,, | Performed by: NURSE PRACTITIONER

## 2020-04-22 RX ORDER — ESCITALOPRAM OXALATE 10 MG/1
10 TABLET ORAL DAILY
Qty: 90 TABLET | Refills: 0 | Status: SHIPPED | OUTPATIENT
Start: 2020-04-22 | End: 2020-07-16 | Stop reason: SDUPTHER

## 2020-04-22 RX ORDER — LOSARTAN POTASSIUM 50 MG/1
50 TABLET ORAL DAILY
Qty: 90 TABLET | Refills: 0 | Status: SHIPPED | OUTPATIENT
Start: 2020-04-22 | End: 2020-07-16 | Stop reason: SDUPTHER

## 2020-04-22 RX ORDER — RABEPRAZOLE SODIUM 20 MG/1
20 TABLET, DELAYED RELEASE ORAL DAILY
Qty: 90 TABLET | Refills: 0 | Status: SHIPPED | OUTPATIENT
Start: 2020-04-22 | End: 2020-07-16 | Stop reason: SDUPTHER

## 2020-04-22 NOTE — PROGRESS NOTES
Subjective:        The chief complaint leading to consultation is: check up  The patient location is:  Home  Visit type: Virtual visit with synchronous audio/video or audio only  This was a video visit in lieu of in-person visit due to the coronavirus emergency. Patient acknowledged and consented to the video visit encounter.     75 year old female presents for virtual visit. Overall feeling ok. Snacking more due to quarantine. Recently saw cardiologist. Dr. Davis. At that time was found to be in atrial flutter. Plans to have echo and stress test done within the next few weeks. No chest pain. No shortness of breath. Fbs 152mg/dl. Sleeping fine.       Past Surgical History:   Procedure Laterality Date    APPENDECTOMY      AV NODE ABLATION      x 2    CATARACT EXTRACTION Right     CHOLECYSTECTOMY      FOOT SURGERY Right     Achilles tendon replacement    HYSTERECTOMY      SENTINEL LYMPH NODE BIOPSY Right 3/14/2019    Procedure: BIOPSY, LYMPH NODE, SENTINEL;  Surgeon: Lisa Youssef MD;  Location: [facility] OR;  Service: General;  Laterality: Right;    TONSILLECTOMY      WISDOM TOOTH EXTRACTION       Past Medical History:   Diagnosis Date    A-fib     Anticoagulant long-term use     Arthritis     Breast cancer 11/2018    right breast    Diabetes mellitus     Gastric reflux     Glaucoma     Hyperlipemia     Hypertension      Family History   Problem Relation Age of Onset    Heart disease Mother     Breast cancer Sister         age unknown    Breast cancer Daughter 47        Social History:   Marital Status:   Alcohol History:  reports that she does not drink alcohol.  Tobacco History:  reports that she has never smoked. She has never used smokeless tobacco.  Drug History:  reports that she does not use drugs.    Review of patient's allergies indicates:   Allergen Reactions    Adhesive Rash       Current Outpatient Medications   Medication Sig Dispense Refill    aspirin (ECOTRIN) 81 MG  "EC tablet Take 1 tablet (81 mg total) by mouth once daily. HOLDING FOR 4 DAYS PRIOR TO SURGERY  0    b complex vitamins tablet Take 1 tablet by mouth once daily.      cholecalciferol, vitamin D3, (VITAMIN D3) 1,000 unit capsule 1 capsule      clobetasoL (TEMOVATE) 0.05 % external solution       escitalopram oxalate (LEXAPRO) 10 MG tablet Take 1 tablet (10 mg total) by mouth once daily. 90 tablet 0    ferrous sulfate (IRON) 325 mg (65 mg iron) Tab tablet Take 325 mg by mouth daily with breakfast.      insulin glargine (LANTUS) 100 unit/mL injection Inject 65 Units into the skin every evening. 5850 mL 1    Lactobac no.41/Bifidobact no.7 (PROBIOTIC-10 ORAL) Take by mouth.      latanoprost 0.005 % ophthalmic solution Place 1 drop into both eyes every evening. Take night before surgery      letrozole (FEMARA) 2.5 mg Tab Take 2.5 mg by mouth once daily .      losartan (COZAAR) 50 MG tablet Take 1 tablet (50 mg total) by mouth once daily. Hold morning of surgery 90 tablet 0    magnesium-calcium-folic acid 400-200-1 mg Tab Take by mouth.      metoprolol tartrate (LOPRESSOR) 25 MG tablet Take 12.5 mg by mouth once daily. Take morning of surgery      PEN NEEDLE 31 gauge x 5/16" Ndle       pravastatin (PRAVACHOL) 40 MG tablet Take 40 mg by mouth every evening. Take night before surgery and take an additional dose Pravastatin 40mg morning of surgery      RABEprazole (ACIPHEX) 20 mg tablet Take 1 tablet (20 mg total) by mouth once daily. Morning of surgery 90 tablet 0    rivaroxaban (XARELTO) 20 mg Tab Take 20 mg by mouth daily with dinner or evening meal. Hold per MD instructions      timolol (BETIMOL) 0.5 % ophthalmic solution Place 1 drop into both eyes once daily. Take morning of surgery       No current facility-administered medications for this visit.        Review of Systems   Constitutional: Negative for chills, fever and unexpected weight change.   HENT: Negative for ear pain, rhinorrhea and sore " throat.    Eyes: Negative for pain and visual disturbance.   Respiratory: Negative for cough and shortness of breath.    Cardiovascular: Negative for chest pain, palpitations and leg swelling.   Gastrointestinal: Negative for abdominal pain, diarrhea, nausea and vomiting.   Musculoskeletal: Negative for arthralgias.   Skin: Negative for rash.   Neurological: Negative for dizziness, weakness and headaches.   Psychiatric/Behavioral: Negative for agitation and sleep disturbance. The patient is not nervous/anxious.          Objective:        Physical Exam:   Physical Exam   Constitutional: She appears well-developed and well-nourished. No distress.   Psychiatric: She has a normal mood and affect. Her behavior is normal.            Assessment:       1. Gastroesophageal reflux disease, esophagitis presence not specified    2. Type 2 diabetes mellitus with other specified complication, unspecified whether long term insulin use    3. Hypertension, unspecified type    4. Hyperlipidemia, unspecified hyperlipidemia type    5. Depression, unspecified depression type    6. History of malignant neoplasm of breast    7. Atrial flutter, unspecified type      Plan:   Gastroesophageal reflux disease, esophagitis presence not specified  -     RABEprazole (ACIPHEX) 20 mg tablet; Take 1 tablet (20 mg total) by mouth once daily. Morning of surgery  Dispense: 90 tablet; Refill: 0    Type 2 diabetes mellitus with other specified complication, unspecified whether long term insulin use  Comments:  labs in july  Orders:  -     CBC auto differential; Future; Expected date: 04/22/2020  -     Comprehensive metabolic panel; Future; Expected date: 04/22/2020  -     Lipid panel; Future; Expected date: 04/22/2020  -     TSH w/reflex to FT4; Future; Expected date: 04/22/2020  -     Microalbumin/creatinine urine ratio; Future; Expected date: 04/22/2020  -     Urinalysis, Reflex to Urine Culture Urine, Clean Catch; Future; Expected date:  04/22/2020    Hypertension, unspecified type  -     losartan (COZAAR) 50 MG tablet; Take 1 tablet (50 mg total) by mouth once daily. Hold morning of surgery  Dispense: 90 tablet; Refill: 0    Hyperlipidemia, unspecified hyperlipidemia type    Depression, unspecified depression type  -     escitalopram oxalate (LEXAPRO) 10 MG tablet; Take 1 tablet (10 mg total) by mouth once daily.  Dispense: 90 tablet; Refill: 0    History of malignant neoplasm of breast    Atrial flutter, unspecified type  -     CBC auto differential; Future; Expected date: 04/22/2020  -     Comprehensive metabolic panel; Future; Expected date: 04/22/2020  -     Lipid panel; Future; Expected date: 04/22/2020  -     TSH w/reflex to FT4; Future; Expected date: 04/22/2020  -     Microalbumin/creatinine urine ratio; Future; Expected date: 04/22/2020  -     Urinalysis, Reflex to Urine Culture Urine, Clean Catch; Future; Expected date: 04/22/2020      Follow up in about 3 months (around 7/22/2020), or if symptoms worsen or fail to improve, for Diabetic Check-Up, medication management.    Total time spent with patient: 15 min    Each patient to whom he or she provides medical services by telemedicine is:  (1) informed of the relationship between the physician and patient and the respective role of any other health care provider with respect to management of the patient; and (2) notified that he or she may decline to receive medical services by telemedicine and may withdraw from such care at any time.    This note was created using [employer] voice recognition software that occasionally misinterprets phrases or words.

## 2020-06-29 DIAGNOSIS — E11.42 DIABETIC POLYNEUROPATHY ASSOCIATED WITH TYPE 2 DIABETES MELLITUS: ICD-10-CM

## 2020-06-29 RX ORDER — INSULIN GLARGINE 100 [IU]/ML
65 INJECTION, SOLUTION SUBCUTANEOUS NIGHTLY
Qty: 5850 ML | Refills: 1 | Status: SHIPPED | OUTPATIENT
Start: 2020-06-29 | End: 2020-07-16

## 2020-07-06 ENCOUNTER — TELEPHONE (OUTPATIENT)
Dept: FAMILY MEDICINE | Facility: CLINIC | Age: 75
End: 2020-07-06

## 2020-07-06 NOTE — TELEPHONE ENCOUNTER
Spoke to pt to let her know she is due to have fasting labs. Pt stated she just recently had labs done for another provider. Pt stated she will have them send over a copy of the labs

## 2020-07-15 DIAGNOSIS — F32.A DEPRESSION, UNSPECIFIED DEPRESSION TYPE: ICD-10-CM

## 2020-07-15 RX ORDER — ESCITALOPRAM OXALATE 10 MG/1
10 TABLET ORAL DAILY
Qty: 90 TABLET | Refills: 0 | Status: CANCELLED | OUTPATIENT
Start: 2020-07-15

## 2020-07-15 NOTE — TELEPHONE ENCOUNTER
----- Message from Danny Castaneda sent at 7/15/2020 12:13 PM CDT -----  Regarding: refills  Losartan   Acid reflux   ----- Message -----  From: Danny Castaneda  Sent: 7/15/2020  12:12 PM CDT  To: Karlee Encarnacion Staff  Subject: refills                                          Lexapro  Pharm [facility]   Pt [phone]

## 2020-07-15 NOTE — TELEPHONE ENCOUNTER
----- Message from Danny Castaneda sent at 7/15/2020 12:12 PM CDT -----  Regarding: refills  Lexapro  Pharm [facility]   Pt [phone]     [[email or web address]] : slight left CHL

## 2020-07-16 ENCOUNTER — OFFICE VISIT (OUTPATIENT)
Dept: FAMILY MEDICINE | Facility: CLINIC | Age: 75
End: 2020-07-16
Payer: MEDICARE

## 2020-07-16 VITALS
DIASTOLIC BLOOD PRESSURE: 78 MMHG | BODY MASS INDEX: 30.49 KG/M2 | HEIGHT: 65 IN | WEIGHT: 183 LBS | SYSTOLIC BLOOD PRESSURE: 138 MMHG | HEART RATE: 104 BPM

## 2020-07-16 DIAGNOSIS — I10 HYPERTENSION, UNSPECIFIED TYPE: ICD-10-CM

## 2020-07-16 DIAGNOSIS — E78.5 HYPERLIPIDEMIA, UNSPECIFIED HYPERLIPIDEMIA TYPE: ICD-10-CM

## 2020-07-16 DIAGNOSIS — Z85.3 HISTORY OF MALIGNANT NEOPLASM OF BREAST: ICD-10-CM

## 2020-07-16 DIAGNOSIS — Z78.0 MENOPAUSE: Primary | ICD-10-CM

## 2020-07-16 DIAGNOSIS — F32.A DEPRESSION, UNSPECIFIED DEPRESSION TYPE: ICD-10-CM

## 2020-07-16 DIAGNOSIS — E11.69 TYPE 2 DIABETES MELLITUS WITH OTHER SPECIFIED COMPLICATION, UNSPECIFIED WHETHER LONG TERM INSULIN USE: ICD-10-CM

## 2020-07-16 DIAGNOSIS — K21.9 GASTROESOPHAGEAL REFLUX DISEASE, ESOPHAGITIS PRESENCE NOT SPECIFIED: ICD-10-CM

## 2020-07-16 PROCEDURE — 3078F DIAST BP <80 MM HG: CPT | Mod: S$GLB,,, | Performed by: NURSE PRACTITIONER

## 2020-07-16 PROCEDURE — 1159F MED LIST DOCD IN RCRD: CPT | Mod: S$GLB,,, | Performed by: NURSE PRACTITIONER

## 2020-07-16 PROCEDURE — 3008F BODY MASS INDEX DOCD: CPT | Mod: S$GLB,,, | Performed by: NURSE PRACTITIONER

## 2020-07-16 PROCEDURE — 3075F SYST BP GE 130 - 139MM HG: CPT | Mod: S$GLB,,, | Performed by: NURSE PRACTITIONER

## 2020-07-16 PROCEDURE — 3051F HG A1C>EQUAL 7.0%<8.0%: CPT | Mod: S$GLB,,, | Performed by: NURSE PRACTITIONER

## 2020-07-16 PROCEDURE — 1101F PT FALLS ASSESS-DOCD LE1/YR: CPT | Mod: S$GLB,,, | Performed by: NURSE PRACTITIONER

## 2020-07-16 PROCEDURE — 3008F PR BODY MASS INDEX (BMI) DOCUMENTED: ICD-10-PCS | Mod: S$GLB,,, | Performed by: NURSE PRACTITIONER

## 2020-07-16 PROCEDURE — 99214 OFFICE O/P EST MOD 30 MIN: CPT | Mod: S$GLB,,, | Performed by: NURSE PRACTITIONER

## 2020-07-16 PROCEDURE — 1101F PR PT FALLS ASSESS DOC 0-1 FALLS W/OUT INJ PAST YR: ICD-10-PCS | Mod: S$GLB,,, | Performed by: NURSE PRACTITIONER

## 2020-07-16 PROCEDURE — 3075F PR MOST RECENT SYSTOLIC BLOOD PRESS GE 130-139MM HG: ICD-10-PCS | Mod: S$GLB,,, | Performed by: NURSE PRACTITIONER

## 2020-07-16 PROCEDURE — 3051F PR MOST RECENT HEMOGLOBIN A1C LEVEL 7.0 - < 8.0%: ICD-10-PCS | Mod: S$GLB,,, | Performed by: NURSE PRACTITIONER

## 2020-07-16 PROCEDURE — 99214 PR OFFICE/OUTPT VISIT, EST, LEVL IV, 30-39 MIN: ICD-10-PCS | Mod: S$GLB,,, | Performed by: NURSE PRACTITIONER

## 2020-07-16 PROCEDURE — 1159F PR MEDICATION LIST DOCUMENTED IN MEDICAL RECORD: ICD-10-PCS | Mod: S$GLB,,, | Performed by: NURSE PRACTITIONER

## 2020-07-16 PROCEDURE — 3078F PR MOST RECENT DIASTOLIC BLOOD PRESSURE < 80 MM HG: ICD-10-PCS | Mod: S$GLB,,, | Performed by: NURSE PRACTITIONER

## 2020-07-16 RX ORDER — ESCITALOPRAM OXALATE 10 MG/1
10 TABLET ORAL DAILY
Qty: 90 TABLET | Refills: 1 | Status: SHIPPED | OUTPATIENT
Start: 2020-07-16 | End: 2020-07-20 | Stop reason: SDUPTHER

## 2020-07-16 RX ORDER — LOSARTAN POTASSIUM 50 MG/1
50 TABLET ORAL DAILY
Qty: 90 TABLET | Refills: 1 | Status: SHIPPED | OUTPATIENT
Start: 2020-07-16 | End: 2020-07-20 | Stop reason: SDUPTHER

## 2020-07-16 RX ORDER — ROSUVASTATIN CALCIUM 20 MG/1
1 TABLET, COATED ORAL DAILY
COMMUNITY
Start: 2020-07-09

## 2020-07-16 RX ORDER — METOPROLOL SUCCINATE 100 MG/1
1 TABLET, EXTENDED RELEASE ORAL DAILY
COMMUNITY
Start: 2020-07-10

## 2020-07-16 RX ORDER — RABEPRAZOLE SODIUM 20 MG/1
20 TABLET, DELAYED RELEASE ORAL DAILY
Qty: 90 TABLET | Refills: 1 | Status: SHIPPED | OUTPATIENT
Start: 2020-07-16 | End: 2020-07-20 | Stop reason: SDUPTHER

## 2020-07-16 RX ORDER — INSULIN GLARGINE 100 [IU]/ML
51 INJECTION, SOLUTION SUBCUTANEOUS DAILY
COMMUNITY
Start: 2020-06-29

## 2020-07-16 NOTE — PROGRESS NOTES
SUBJECTIVE:    Patient ID: Maria T Lund is a 75 y.o. female.    Chief Complaint: Diabetes (brought list of meds ac // eye exam- Dr. Kat // Dexa - due order in .ac // Only wants refills on Losartan, Rabeprazole, and Lexapro)    75 year old female presents for check up. Treated for htn, atrial fibrillation, hyperlipidemia, gerd, dm,  and history of breast cancer. Being followed by multiple specialists. Not watching diet as much. Still taking lantus daily. fbs in am has been around 160mg/dl. Needs refills on meds. Using cane for ambulation. No recent falls.       Office Visit on 02/13/2020   Component Date Value Ref Range Status    Hemoglobin A1C 02/13/2020 7.5  % Final    WBC 02/13/2020 8.3  3.8 - 10.8 Thousand/uL Final    RBC 02/13/2020 5.11* 3.80 - 5.10 Million/uL Final    Hemoglobin 02/13/2020 14.4  11.7 - 15.5 g/dL Final    Hematocrit 02/13/2020 43.9  35.0 - 45.0 % Final    Mean Corpuscular Volume 02/13/2020 85.9  80.0 - 100.0 fL Final    Mean Corpuscular Hemoglobin 02/13/2020 28.2  27.0 - 33.0 pg Final    Mean Corpuscular Hemoglobin Conc 02/13/2020 32.8  32.0 - 36.0 g/dL Final    RDW 02/13/2020 13.8  11.0 - 15.0 % Final    Platelets 02/13/2020 277  140 - 400 Thousand/uL Final    MPV 02/13/2020 10.0  7.5 - 12.5 fL Final    Neutrophils Absolute 02/13/2020 6,864  1,500 - 7,800 cells/uL Final    Lymph # 02/13/2020 1,004  850 - 3,900 cells/uL Final    Mono # 02/13/2020 340  200 - 950 cells/uL Final    Eos # 02/13/2020 58  15 - 500 cells/uL Final    Baso # 02/13/2020 33  0 - 200 cells/uL Final    Neutrophils Relative 02/13/2020 82.7  % Final    Lymph% 02/13/2020 12.1  % Final    Mono% 02/13/2020 4.1  % Final    Eosinophil% 02/13/2020 0.7  % Final    Basophil% 02/13/2020 0.4  % Final    Glucose 02/13/2020 257* 65 - 99 mg/dL Final    BUN, Bld 02/13/2020 21  7 - 25 mg/dL Final    Creatinine 02/13/2020 0.80  0.60 - 0.93 mg/dL Final    eGFR if non African American 02/13/2020 73   > OR = 60 mL/min/1.73m2 Final    eGFR if African American 02/13/2020 84  > OR = 60 mL/min/1.73m2 Final    BUN/Creatinine Ratio 02/13/2020 NOT APPLICABLE  6 - 22 (calc) Final    Sodium 02/13/2020 137  135 - 146 mmol/L Final    Potassium 02/13/2020 4.7  3.5 - 5.3 mmol/L Final    Chloride 02/13/2020 98  98 - 110 mmol/L Final    CO2 02/13/2020 29  20 - 32 mmol/L Final    Calcium 02/13/2020 9.8  8.6 - 10.4 mg/dL Final    Total Protein 02/13/2020 7.6  6.1 - 8.1 g/dL Final    Albumin 02/13/2020 4.7  3.6 - 5.1 g/dL Final    Globulin, Total 02/13/2020 2.9  1.9 - 3.7 g/dL (calc) Final    Albumin/Globulin Ratio 02/13/2020 1.6  1.0 - 2.5 (calc) Final    Total Bilirubin 02/13/2020 1.3* 0.2 - 1.2 mg/dL Final    Alkaline Phosphatase 02/13/2020 60  37 - 153 U/L Final    AST 02/13/2020 14  10 - 35 U/L Final    ALT 02/13/2020 15  6 - 29 U/L Final    Cholesterol 02/13/2020 164  <200 mg/dL Final    HDL 02/13/2020 56  > OR = 50 mg/dL Final    Triglycerides 02/13/2020 111  <150 mg/dL Final    LDL Cholesterol 02/13/2020 87  mg/dL (calc) Final    Hdl/Cholesterol Ratio 02/13/2020 2.9  <5.0 (calc) Final    Non HDL Chol. (LDL+VLDL) 02/13/2020 108  <130 mg/dL (calc) Final    TSH w/reflex to FT4 02/13/2020 2.26  0.40 - 4.50 mIU/L Final    Creatinine, Random Ur 02/13/2020 159  20 - 275 mg/dL Final    Microalb, Ur 02/13/2020 22.7  See Note: mg/dL Final    Microalb Creat Ratio 02/13/2020 143* <30 mcg/mg creat Final    Color, UA 02/13/2020 DARK YELLOW  YELLOW Final    Appearance, UA 02/13/2020 CLEAR  CLEAR Final    Specific [place], UA 02/13/2020 1.026  1.001 - 1.035 Final    pH, UA 02/13/2020 < OR = 5.0  5.0 - 8.0 Final    Glucose, UA 02/13/2020 TRACE* NEGATIVE Final    Bilirubin, UA 02/13/2020 NEGATIVE  NEGATIVE Final    Ketones, UA 02/13/2020 TRACE* NEGATIVE Final    Occult Blood UA 02/13/2020 NEGATIVE  NEGATIVE Final    Protein, UA 02/13/2020 1+* NEGATIVE Final    Nitrite, UA 02/13/2020 NEGATIVE  NEGATIVE  Final    Leukocytes, UA 02/13/2020 TRACE* NEGATIVE Final    WBC Casts, UA 02/13/2020 0-5  < OR = 5 /HPF Final    RBC Casts, UA 02/13/2020 0-2  < OR = 2 /HPF Final    Squam Epithel, UA 02/13/2020 0-5  < OR = 5 /HPF Final    Bacteria, UA 02/13/2020 NONE SEEN  NONE SEEN /HPF Final    Hyaline Casts, UA 02/13/2020 NONE SEEN  NONE SEEN /LPF Final    Reflexive Urine Culture 02/13/2020 CULTURE INDICATED - RESULTS TO FOLLOW   Final    Urine Culture, Routine 02/13/2020    Final       Past Medical History:   Diagnosis Date    A-fib     Anticoagulant long-term use     Arthritis     Breast cancer 11/2018    right breast    Diabetes mellitus     Gastric reflux     Glaucoma     Hyperlipemia     Hypertension      Past Surgical History:   Procedure Laterality Date    APPENDECTOMY      AV NODE ABLATION      x 2    CATARACT EXTRACTION Right     CHOLECYSTECTOMY      FOOT SURGERY Right     Achilles tendon replacement    HYSTERECTOMY      SENTINEL LYMPH NODE BIOPSY Right 3/14/2019    Procedure: BIOPSY, LYMPH NODE, SENTINEL;  Surgeon: Lisa Youssef MD;  Location: [facility];  Service: General;  Laterality: Right;    TONSILLECTOMY      WISDOM TOOTH EXTRACTION       Family History   Problem Relation Age of Onset    Heart disease Mother     Breast cancer Sister         age unknown    Breast cancer Daughter 47       Marital Status:   Alcohol History:  reports no history of alcohol use.  Tobacco History:  reports that she has never smoked. She has never used smokeless tobacco.  Drug History:  reports no history of drug use.    Review of patient's allergies indicates:   Allergen Reactions    Adhesive Rash       Current Outpatient Medications:     aspirin (ECOTRIN) 81 MG EC tablet, Take 1 tablet (81 mg total) by mouth once daily. HOLDING FOR 4 DAYS PRIOR TO SURGERY, Disp: , Rfl: 0    b complex vitamins tablet, Take 1 tablet by mouth once daily., Disp: , Rfl:     cholecalciferol, vitamin D3, (VITAMIN  "D3) 1,000 unit capsule, 1 capsule, Disp: , Rfl:     escitalopram oxalate (LEXAPRO) 10 MG tablet, Take 1 tablet (10 mg total) by mouth once daily., Disp: 90 tablet, Rfl: 1    ferrous sulfate (IRON) 325 mg (65 mg iron) Tab tablet, Take 325 mg by mouth daily with breakfast., Disp: , Rfl:     Lactobac no.41/Bifidobact no.7 (PROBIOTIC-10 ORAL), Take by mouth., Disp: , Rfl:     LANTUS SOLOSTAR U-100 INSULIN glargine 100 units/mL (3mL) SubQ pen, Inject 51 Units into the skin once daily., Disp: , Rfl:     latanoprost 0.005 % ophthalmic solution, Place 1 drop into both eyes every evening. Take night before surgery, Disp: , Rfl:     letrozole (FEMARA) 2.5 mg Tab, Take 2.5 mg by mouth once daily ., Disp: , Rfl:     losartan (COZAAR) 50 MG tablet, Take 1 tablet (50 mg total) by mouth once daily. Hold morning of surgery, Disp: 90 tablet, Rfl: 1    magnesium-calcium-folic acid 400-200-1 mg Tab, Take by mouth., Disp: , Rfl:     metoprolol succinate (TOPROL-XL) 100 MG 24 hr tablet, Take 1 tablet by mouth once daily., Disp: , Rfl:     PEN NEEDLE 31 gauge x 5/16" Ndle, , Disp: , Rfl:     RABEprazole (ACIPHEX) 20 mg tablet, Take 1 tablet (20 mg total) by mouth once daily. Morning of surgery, Disp: 90 tablet, Rfl: 1    rivaroxaban (XARELTO) 20 mg Tab, Take 20 mg by mouth daily with dinner or evening meal. Hold per MD instructions, Disp: , Rfl:     rosuvastatin (CRESTOR) 20 MG tablet, Take 1 tablet by mouth once daily., Disp: , Rfl:     timolol (BETIMOL) 0.5 % ophthalmic solution, Place 1 drop into both eyes once daily. Take morning of surgery, Disp: , Rfl:     Review of Systems   Constitutional: Negative for chills, fever and unexpected weight change.   HENT: Negative for ear pain, rhinorrhea and sore throat.    Respiratory: Negative for cough and shortness of breath.    Cardiovascular: Negative for chest pain, palpitations and leg swelling.   Gastrointestinal: Negative for abdominal pain, diarrhea, nausea and vomiting. " "  Genitourinary: Negative for hematuria and vaginal bleeding.   Musculoskeletal: Negative for arthralgias.   Skin: Negative for rash.   Neurological: Negative for dizziness, weakness and headaches.   Psychiatric/Behavioral: Positive for agitation. Negative for sleep disturbance. The patient is not nervous/anxious.           Objective:      Vitals:    07/16/20 0806   BP: 138/78   Pulse: 104   Weight: 83 kg (183 lb)   Height: 5' 4.5" (1.638 m)     Body mass index is 30.93 kg/m².  Physical Exam  Constitutional:       Appearance: She is well-developed.   HENT:      Right Ear: External ear normal.      Left Ear: External ear normal.   Neck:      Musculoskeletal: Normal range of motion and neck supple.      Vascular: No JVD.   Cardiovascular:      Rate and Rhythm: Normal rate and regular rhythm.      Heart sounds: No murmur.   Pulmonary:      Effort: Pulmonary effort is normal.      Breath sounds: Normal breath sounds.   Abdominal:      General: Bowel sounds are normal.      Palpations: Abdomen is soft.   Musculoskeletal: Normal range of motion.         General: No deformity.        Feet:    Feet:      Right foot:      Protective Sensation: 5 sites tested. 5 sites sensed.      Left foot:      Protective Sensation: 5 sites tested. 5 sites sensed.   Lymphadenopathy:      Cervical: No cervical adenopathy.   Skin:     General: Skin is warm and dry.      Findings: No rash.   Neurological:      Mental Status: She is alert and oriented to person, place, and time.      Gait: Gait normal.   Psychiatric:         Speech: Speech normal.         Behavior: Behavior normal.           Assessment:       1. Menopause    2. Gastroesophageal reflux disease, esophagitis presence not specified    3. Hypertension, unspecified type    4. Depression, unspecified depression type    5. Type 2 diabetes mellitus with other specified complication, unspecified whether long term insulin use    6. Hyperlipidemia, unspecified hyperlipidemia type    7. " History of malignant neoplasm of breast         Plan:       Menopause  -     DXA Bone Density Spine And Hip; Future; Expected date: 07/16/2020    Gastroesophageal reflux disease, esophagitis presence not specified  -     RABEprazole (ACIPHEX) 20 mg tablet; Take 1 tablet (20 mg total) by mouth once daily. Morning of surgery  Dispense: 90 tablet; Refill: 1    Hypertension, unspecified type  -     losartan (COZAAR) 50 MG tablet; Take 1 tablet (50 mg total) by mouth once daily. Hold morning of surgery  Dispense: 90 tablet; Refill: 1    Depression, unspecified depression type  -     escitalopram oxalate (LEXAPRO) 10 MG tablet; Take 1 tablet (10 mg total) by mouth once daily.  Dispense: 90 tablet; Refill: 1    Type 2 diabetes mellitus with other specified complication, unspecified whether long term insulin use  Comments:  change lantus to 28 units in am and 28 units in pm    Hyperlipidemia, unspecified hyperlipidemia type    History of malignant neoplasm of breast      Follow up in about 3 months (around 10/16/2020) for medication management.

## 2020-07-20 DIAGNOSIS — K21.9 GASTROESOPHAGEAL REFLUX DISEASE, ESOPHAGITIS PRESENCE NOT SPECIFIED: ICD-10-CM

## 2020-07-20 DIAGNOSIS — F32.A DEPRESSION, UNSPECIFIED DEPRESSION TYPE: ICD-10-CM

## 2020-07-20 DIAGNOSIS — I10 HYPERTENSION, UNSPECIFIED TYPE: ICD-10-CM

## 2020-07-20 RX ORDER — RABEPRAZOLE SODIUM 20 MG/1
20 TABLET, DELAYED RELEASE ORAL DAILY
Qty: 90 TABLET | Refills: 1 | Status: SHIPPED | OUTPATIENT
Start: 2020-07-20

## 2020-07-20 RX ORDER — LOSARTAN POTASSIUM 50 MG/1
50 TABLET ORAL DAILY
Qty: 90 TABLET | Refills: 1 | Status: SHIPPED | OUTPATIENT
Start: 2020-07-20

## 2020-07-20 RX ORDER — ESCITALOPRAM OXALATE 10 MG/1
10 TABLET ORAL DAILY
Qty: 90 TABLET | Refills: 1 | Status: SHIPPED | OUTPATIENT
Start: 2020-07-20

## 2020-07-20 NOTE — TELEPHONE ENCOUNTER
----- Message from Efraín Monroy sent at 7/20/2020 10:33 AM CDT -----  Regarding: Refill  Contact: Vanesa Clayton  Pt says her refills were sent to the wrong pharmacy . They were supposed to be sent to the [facility] . Please re-send the Losartan, lexapro and Aciphex to [facility]   Pt# [phone]

## 2020-08-28 ENCOUNTER — HOSPITAL ENCOUNTER (EMERGENCY)
Facility: HOSPITAL | Age: 75
Discharge: ANOTHER HOSPITAL | End: 2020-08-28
Attending: EMERGENCY MEDICINE
Payer: MEDICARE

## 2020-08-28 ENCOUNTER — HOSPITAL ENCOUNTER (OUTPATIENT)
Facility: HOSPITAL | Age: 75
Discharge: HOME HEALTH | End: 2020-08-30
Attending: HOSPITALIST | Admitting: HOSPITALIST
Payer: MEDICARE

## 2020-08-28 VITALS
DIASTOLIC BLOOD PRESSURE: 82 MMHG | RESPIRATION RATE: 18 BRPM | HEART RATE: 101 BPM | WEIGHT: 183 LBS | BODY MASS INDEX: 30.49 KG/M2 | SYSTOLIC BLOOD PRESSURE: 149 MMHG | OXYGEN SATURATION: 92 % | HEIGHT: 65 IN | TEMPERATURE: 98 F

## 2020-08-28 DIAGNOSIS — R41.82 AMS (ALTERED MENTAL STATUS): ICD-10-CM

## 2020-08-28 DIAGNOSIS — I63.9 CVA (CEREBRAL VASCULAR ACCIDENT): ICD-10-CM

## 2020-08-28 DIAGNOSIS — R41.82 ALTERED MENTAL STATUS, UNSPECIFIED ALTERED MENTAL STATUS TYPE: ICD-10-CM

## 2020-08-28 DIAGNOSIS — R41.82 ALTERED MENTAL STATUS: Primary | ICD-10-CM

## 2020-08-28 DIAGNOSIS — R41.82 ALTERED MENTAL STATUS, UNSPECIFIED ALTERED MENTAL STATUS TYPE: Primary | ICD-10-CM

## 2020-08-28 LAB
ALBUMIN SERPL BCP-MCNC: 4.2 G/DL (ref 3.5–5.2)
ALP SERPL-CCNC: 61 U/L (ref 55–135)
ALT SERPL W/O P-5'-P-CCNC: 19 U/L (ref 10–44)
AMMONIA PLAS-SCNC: 10 UMOL/L (ref 10–50)
AMPHET+METHAMPHET UR QL: NEGATIVE
ANION GAP SERPL CALC-SCNC: 13 MMOL/L (ref 8–16)
AST SERPL-CCNC: 20 U/L (ref 10–40)
BACTERIA #/AREA URNS HPF: ABNORMAL /HPF
BARBITURATES UR QL SCN>200 NG/ML: NEGATIVE
BASOPHILS # BLD AUTO: 0.05 K/UL (ref 0–0.2)
BASOPHILS NFR BLD: 0.6 % (ref 0–1.9)
BENZODIAZ UR QL SCN>200 NG/ML: NEGATIVE
BILIRUB SERPL-MCNC: 1.3 MG/DL (ref 0.1–1)
BILIRUB UR QL STRIP: NEGATIVE
BNP SERPL-MCNC: 218 PG/ML (ref 0–99)
BUN SERPL-MCNC: 23 MG/DL (ref 8–23)
BZE UR QL SCN: NEGATIVE
CALCIUM SERPL-MCNC: 9.2 MG/DL (ref 8.7–10.5)
CANNABINOIDS UR QL SCN: NEGATIVE
CHLORIDE SERPL-SCNC: 98 MMOL/L (ref 95–110)
CLARITY UR: CLEAR
CO2 SERPL-SCNC: 25 MMOL/L (ref 23–29)
COLOR UR: YELLOW
CREAT SERPL-MCNC: 0.5 MG/DL (ref 0.5–1.4)
CREAT UR-MCNC: 98.5 MG/DL (ref 15–325)
DIFFERENTIAL METHOD: NORMAL
EOSINOPHIL # BLD AUTO: 0.2 K/UL (ref 0–0.5)
EOSINOPHIL NFR BLD: 2.4 % (ref 0–8)
ERYTHROCYTE [DISTWIDTH] IN BLOOD BY AUTOMATED COUNT: 13.2 % (ref 11.5–14.5)
EST. GFR  (AFRICAN AMERICAN): >60 ML/MIN/1.73 M^2
EST. GFR  (NON AFRICAN AMERICAN): >60 ML/MIN/1.73 M^2
ETHANOL SERPL-MCNC: <5 MG/DL
GLUCOSE SERPL-MCNC: 225 MG/DL (ref 70–110)
GLUCOSE UR QL STRIP: ABNORMAL
HCT VFR BLD AUTO: 42.3 % (ref 37–48.5)
HGB BLD-MCNC: 14.7 G/DL (ref 12–16)
HGB UR QL STRIP: NEGATIVE
HYALINE CASTS #/AREA URNS LPF: 3 /LPF
IMM GRANULOCYTES # BLD AUTO: 0.04 K/UL (ref 0–0.04)
IMM GRANULOCYTES NFR BLD AUTO: 0.5 % (ref 0–0.5)
INR PPP: 1.2 (ref 0.8–1.2)
KETONES UR QL STRIP: NEGATIVE
LEUKOCYTE ESTERASE UR QL STRIP: NEGATIVE
LYMPHOCYTES # BLD AUTO: 1.7 K/UL (ref 1–4.8)
LYMPHOCYTES NFR BLD: 19.7 % (ref 18–48)
MCH RBC QN AUTO: 28.8 PG (ref 27–31)
MCHC RBC AUTO-ENTMCNC: 34.8 G/DL (ref 32–36)
MCV RBC AUTO: 83 FL (ref 82–98)
MICROSCOPIC COMMENT: ABNORMAL
MONOCYTES # BLD AUTO: 0.8 K/UL (ref 0.3–1)
MONOCYTES NFR BLD: 9.1 % (ref 4–15)
NEUTROPHILS # BLD AUTO: 5.9 K/UL (ref 1.8–7.7)
NEUTROPHILS NFR BLD: 67.7 % (ref 38–73)
NITRITE UR QL STRIP: NEGATIVE
NRBC BLD-RTO: 0 /100 WBC
OPIATES UR QL SCN: NEGATIVE
PCP UR QL SCN>25 NG/ML: NEGATIVE
PH UR STRIP: 7 [PH] (ref 5–8)
PLATELET # BLD AUTO: 262 K/UL (ref 150–350)
PMV BLD AUTO: 9.5 FL (ref 9.2–12.9)
POCT GLUCOSE: 212 MG/DL (ref 70–110)
POTASSIUM SERPL-SCNC: 3.4 MMOL/L (ref 3.5–5.1)
PROT SERPL-MCNC: 7.6 G/DL (ref 6–8.4)
PROT UR QL STRIP: ABNORMAL
PROTHROMBIN TIME: 12.4 SEC (ref 9–12.5)
RBC # BLD AUTO: 5.1 M/UL (ref 4–5.4)
RBC #/AREA URNS HPF: 0 /HPF (ref 0–4)
SARS-COV-2 RDRP RESP QL NAA+PROBE: NEGATIVE
SODIUM SERPL-SCNC: 136 MMOL/L (ref 136–145)
SP GR UR STRIP: 1.02 (ref 1–1.03)
TOXICOLOGY INFORMATION: NORMAL
TROPONIN I SERPL DL<=0.01 NG/ML-MCNC: 0.01 NG/ML (ref 0.02–0.5)
TSH SERPL DL<=0.005 MIU/L-ACNC: 1.53 UIU/ML (ref 0.34–5.6)
URN SPEC COLLECT METH UR: ABNORMAL
UROBILINOGEN UR STRIP-ACNC: 1 EU/DL
WBC # BLD AUTO: 8.67 K/UL (ref 3.9–12.7)
WBC #/AREA URNS HPF: 10 /HPF (ref 0–5)

## 2020-08-28 PROCEDURE — 93005 ELECTROCARDIOGRAM TRACING: CPT

## 2020-08-28 PROCEDURE — U0002 COVID-19 LAB TEST NON-CDC: HCPCS

## 2020-08-28 PROCEDURE — 70450 CT HEAD/BRAIN W/O DYE: CPT | Mod: TC

## 2020-08-28 PROCEDURE — 71045 XR CHEST AP PORTABLE: ICD-10-PCS | Mod: 26,,, | Performed by: RADIOLOGY

## 2020-08-28 PROCEDURE — 27000221 HC OXYGEN, UP TO 24 HOURS

## 2020-08-28 PROCEDURE — 81000 URINALYSIS NONAUTO W/SCOPE: CPT | Mod: 59

## 2020-08-28 PROCEDURE — 71045 X-RAY EXAM CHEST 1 VIEW: CPT | Mod: TC,FY

## 2020-08-28 PROCEDURE — 80053 COMPREHEN METABOLIC PANEL: CPT

## 2020-08-28 PROCEDURE — 70450 CT HEAD WITHOUT CONTRAST: ICD-10-PCS | Mod: 26,,, | Performed by: RADIOLOGY

## 2020-08-28 PROCEDURE — 85610 PROTHROMBIN TIME: CPT

## 2020-08-28 PROCEDURE — 11000001 HC ACUTE MED/SURG PRIVATE ROOM

## 2020-08-28 PROCEDURE — 94760 N-INVAS EAR/PLS OXIMETRY 1: CPT

## 2020-08-28 PROCEDURE — 96374 THER/PROPH/DIAG INJ IV PUSH: CPT

## 2020-08-28 PROCEDURE — 63600175 PHARM REV CODE 636 W HCPCS: Performed by: FAMILY MEDICINE

## 2020-08-28 PROCEDURE — 63600175 PHARM REV CODE 636 W HCPCS: Performed by: EMERGENCY MEDICINE

## 2020-08-28 PROCEDURE — 83880 ASSAY OF NATRIURETIC PEPTIDE: CPT

## 2020-08-28 PROCEDURE — 71045 X-RAY EXAM CHEST 1 VIEW: CPT | Mod: 26,,, | Performed by: RADIOLOGY

## 2020-08-28 PROCEDURE — 84484 ASSAY OF TROPONIN QUANT: CPT

## 2020-08-28 PROCEDURE — 36415 COLL VENOUS BLD VENIPUNCTURE: CPT

## 2020-08-28 PROCEDURE — 82962 GLUCOSE BLOOD TEST: CPT

## 2020-08-28 PROCEDURE — 70450 CT HEAD/BRAIN W/O DYE: CPT | Mod: 26,,, | Performed by: RADIOLOGY

## 2020-08-28 PROCEDURE — 80307 DRUG TEST PRSMV CHEM ANLYZR: CPT

## 2020-08-28 PROCEDURE — 85025 COMPLETE CBC W/AUTO DIFF WBC: CPT

## 2020-08-28 PROCEDURE — 96375 TX/PRO/DX INJ NEW DRUG ADDON: CPT

## 2020-08-28 PROCEDURE — 80320 DRUG SCREEN QUANTALCOHOLS: CPT

## 2020-08-28 PROCEDURE — 84443 ASSAY THYROID STIM HORMONE: CPT

## 2020-08-28 PROCEDURE — 82140 ASSAY OF AMMONIA: CPT

## 2020-08-28 PROCEDURE — 99285 EMERGENCY DEPT VISIT HI MDM: CPT | Mod: 25

## 2020-08-28 RX ORDER — HYDRALAZINE HYDROCHLORIDE 20 MG/ML
10 INJECTION INTRAMUSCULAR; INTRAVENOUS
Status: COMPLETED | OUTPATIENT
Start: 2020-08-28 | End: 2020-08-28

## 2020-08-28 RX ORDER — ONDANSETRON 2 MG/ML
4 INJECTION INTRAMUSCULAR; INTRAVENOUS
Status: COMPLETED | OUTPATIENT
Start: 2020-08-28 | End: 2020-08-28

## 2020-08-28 RX ADMIN — HYDRALAZINE HYDROCHLORIDE 10 MG: 20 INJECTION INTRAMUSCULAR; INTRAVENOUS at 07:08

## 2020-08-28 RX ADMIN — ONDANSETRON HYDROCHLORIDE 4 MG: 2 SOLUTION INTRAMUSCULAR; INTRAVENOUS at 05:08

## 2020-08-28 NOTE — ED NOTES
Patient experiences an episode of nausea. ERP Joaquín orders IVP zofran. Patient asst to reporsition on gurney. Cleansed of a small loose BM.

## 2020-08-28 NOTE — ED NOTES
Patient moves all extremities. Reports some nausea. She is dysarthric. The family states that this is chronic,but seems worse today.

## 2020-08-28 NOTE — PROGRESS NOTES
Spoke with ER physician. We do not have MRI capabilities until Monday morning, 72 hours from now. Given patient with ongoing symptoms of dysarthria, nausea/vomiting, altered mental status recommend transfer to outside facility for MRI and possibly Neurology.

## 2020-08-28 NOTE — ED NOTES
Patient reportedly lives independently at home with her  ,who is her primary care taker. The patient has known early dementia .  reportedly does all household care and cooking. He ensures that she eats and bathes per the daughter. The daughter is brought to the bedside as the patient is a poor historian.   She states that her mother stayed in bed today refusing to get up or eat. She wasn't able to verbalize as well as she usually does but stated that she didn't feel well.

## 2020-08-28 NOTE — ED NOTES
Patient given po fluids .No difficulty swallowing,but relays that she is a little nauseated again.

## 2020-08-28 NOTE — ED PROVIDER NOTES
Encounter Date: 8/28/2020       History     Chief Complaint   Patient presents with    Altered Mental Status    Nausea       75-year-old female brought by family from home for evaluation and treatment of altered mental status.  Initially, family was not available and the only information that the patient could give me was that she was not feeling well and was experiencing nausea.  No vomiting.  She moves all extremities well, but does have some difficulty speaking clearly.  Seems to be somewhat aphasic.        Review of patient's allergies indicates:   Allergen Reactions    Adhesive Rash     Past Medical History:   Diagnosis Date    A-fib     Anticoagulant long-term use     Arthritis     Breast cancer 11/2018    right breast    Diabetes mellitus     Gastric reflux     Glaucoma     Hyperlipemia     Hypertension      Past Surgical History:   Procedure Laterality Date    APPENDECTOMY      AV NODE ABLATION      x 2    CATARACT EXTRACTION Right     CHOLECYSTECTOMY      FOOT SURGERY Right     Achilles tendon replacement    HYSTERECTOMY      SENTINEL LYMPH NODE BIOPSY Right 3/14/2019    Procedure: BIOPSY, LYMPH NODE, SENTINEL;  Surgeon: Lisa Youssef MD;  Location: [facility];  Service: General;  Laterality: Right;    TONSILLECTOMY      WISDOM TOOTH EXTRACTION       Family History   Problem Relation Age of Onset    Heart disease Mother     Breast cancer Sister         age unknown    Breast cancer Daughter 47     Social History     Tobacco Use    Smoking status: Never Smoker    Smokeless tobacco: Never Used   Substance Use Topics    Alcohol use: No     Frequency: Never    Drug use: No     Review of Systems   Constitutional: Negative for chills and fever.   HENT: Negative for congestion, rhinorrhea and trouble swallowing.        Physical Exam     Initial Vitals [08/28/20 1556]   BP Pulse Resp Temp SpO2   (!) 178/81 72 18 98.1 °F (36.7 °C) (!) 94 %      MAP       --         Physical  Exam    Nursing note and vitals reviewed.  Constitutional: She appears well-developed and well-nourished. No distress.   HENT:   Head: Normocephalic and atraumatic.   Nose: Nose normal.   Mouth/Throat: No oropharyngeal exudate.   Eyes: EOM are normal. Pupils are equal, round, and reactive to light. No scleral icterus.         ED Course   Procedures  Labs Reviewed   POCT GLUCOSE - Abnormal; Notable for the following components:       Result Value    POCT Glucose 212 (*)     All other components within normal limits   CBC W/ AUTO DIFFERENTIAL   B-TYPE NATRIURETIC PEPTIDE   COMPREHENSIVE METABOLIC PANEL   SARS-COV-2 RNA AMPLIFICATION, QUAL   ALCOHOL,MEDICAL (ETHANOL)   DRUG SCREEN PANEL, URINE EMERGENCY   URINALYSIS, REFLEX TO URINE CULTURE   TROPONIN I   PROTIME-INR          Imaging Results          CT Head Without Contrast (In process)                X-Ray Chest AP Portable (In process)               X-Rays:   Independently Interpreted Readings:   Other Readings:  No acute findings on head CT or CXR                              Case was discussed with hospitalist.  Recommendation is to transfer to a facility with neurology and MRI capabilities.      Clinical Impression:       ICD-10-CM ICD-9-CM   1. Altered mental status, unspecified altered mental status type  R41.82 780.97   2. AMS (altered mental status)  R41.82 780.97         Disposition:   Disposition: Transferred  Condition: Stable                        Josh Hdez MD  08/28/20 2006     [facility] Muj[facility]

## 2020-08-29 PROBLEM — R41.82 ALTERED MENTAL STATUS: Status: ACTIVE | Noted: 2020-08-29

## 2020-08-29 PROBLEM — E11.59 HYPERTENSION ASSOCIATED WITH DIABETES: Status: ACTIVE | Noted: 2019-11-13

## 2020-08-29 PROBLEM — E78.5 HYPERLIPIDEMIA ASSOCIATED WITH TYPE 2 DIABETES MELLITUS: Status: ACTIVE | Noted: 2020-08-29

## 2020-08-29 PROBLEM — E11.9 TYPE 2 DIABETES MELLITUS: Status: ACTIVE | Noted: 2020-08-29

## 2020-08-29 PROBLEM — I48.0 PAROXYSMAL A-FIB: Status: ACTIVE | Noted: 2019-11-13

## 2020-08-29 PROBLEM — N39.0 UTI (URINARY TRACT INFECTION): Status: ACTIVE | Noted: 2020-08-29

## 2020-08-29 PROBLEM — E11.69 HYPERLIPIDEMIA ASSOCIATED WITH TYPE 2 DIABETES MELLITUS: Status: ACTIVE | Noted: 2020-08-29

## 2020-08-29 PROBLEM — I15.2 HYPERTENSION ASSOCIATED WITH DIABETES: Status: ACTIVE | Noted: 2019-11-13

## 2020-08-29 LAB
ALBUMIN SERPL BCP-MCNC: 4.2 G/DL (ref 3.5–5.2)
ALP SERPL-CCNC: 68 U/L (ref 55–135)
ALT SERPL W/O P-5'-P-CCNC: 16 U/L (ref 10–44)
ANION GAP SERPL CALC-SCNC: 14 MMOL/L (ref 8–16)
AORTIC ROOT ANNULUS: 2.82 CM
AORTIC VALVE CUSP SEPERATION: 1.59 CM
APTT BLDCRRT: 26.6 SEC (ref 21–32)
ASCENDING AORTA: 3.16 CM
AST SERPL-CCNC: 17 U/L (ref 10–40)
AV INDEX (PROSTH): 0.97
AV MEAN GRADIENT: 2 MMHG
AV PEAK GRADIENT: 5 MMHG
AV VALVE AREA: 3.27 CM2
AV VELOCITY RATIO: 0.7
BASOPHILS # BLD AUTO: 0.04 K/UL (ref 0–0.2)
BASOPHILS NFR BLD: 0.4 % (ref 0–1.9)
BILIRUB SERPL-MCNC: 1.2 MG/DL (ref 0.1–1)
BSA FOR ECHO PROCEDURE: 1.95 M2
BUN SERPL-MCNC: 18 MG/DL (ref 8–23)
CALCIUM SERPL-MCNC: 9.5 MG/DL (ref 8.7–10.5)
CHLORIDE SERPL-SCNC: 97 MMOL/L (ref 95–110)
CHOLEST SERPL-MCNC: 147 MG/DL (ref 120–199)
CHOLEST/HDLC SERPL: 2.8 {RATIO} (ref 2–5)
CK MB SERPL-MCNC: 0.9 NG/ML (ref 0.1–6.5)
CK MB SERPL-RTO: 1.9 % (ref 0–5)
CK SERPL-CCNC: 47 U/L (ref 20–180)
CO2 SERPL-SCNC: 25 MMOL/L (ref 23–29)
CREAT SERPL-MCNC: 0.7 MG/DL (ref 0.5–1.4)
CV ECHO LV RWT: 0.32 CM
DIFFERENTIAL METHOD: ABNORMAL
DOP CALC AO PEAK VEL: 1.07 M/S
DOP CALC AO VTI: 15.51 CM
DOP CALC LVOT AREA: 3.4 CM2
DOP CALC LVOT DIAMETER: 2.07 CM
DOP CALC LVOT PEAK VEL: 0.75 M/S
DOP CALC LVOT STROKE VOLUME: 50.69 CM3
DOP CALCLVOT PEAK VEL VTI: 15.07 CM
E WAVE DECELERATION TIME: 153.88 MSEC
E/A RATIO: 2.63
E/E' RATIO: 9.2 M/S
ECHO LV POSTERIOR WALL: 0.77 CM (ref 0.6–1.1)
EOSINOPHIL # BLD AUTO: 0 K/UL (ref 0–0.5)
EOSINOPHIL NFR BLD: 0 % (ref 0–8)
ERYTHROCYTE [DISTWIDTH] IN BLOOD BY AUTOMATED COUNT: 13.2 % (ref 11.5–14.5)
EST. GFR  (AFRICAN AMERICAN): >60 ML/MIN/1.73 M^2
EST. GFR  (NON AFRICAN AMERICAN): >60 ML/MIN/1.73 M^2
ESTIMATED AVG GLUCOSE: 237 MG/DL (ref 68–131)
FRACTIONAL SHORTENING: 34 % (ref 28–44)
GLUCOSE SERPL-MCNC: 233 MG/DL (ref 70–110)
HBA1C MFR BLD HPLC: 9.9 % (ref 4–5.6)
HCT VFR BLD AUTO: 42.4 % (ref 37–48.5)
HDLC SERPL-MCNC: 52 MG/DL (ref 40–75)
HDLC SERPL: 35.4 % (ref 20–50)
HGB BLD-MCNC: 15 G/DL (ref 12–16)
IMM GRANULOCYTES # BLD AUTO: 0.08 K/UL (ref 0–0.04)
IMM GRANULOCYTES NFR BLD AUTO: 0.7 % (ref 0–0.5)
INR PPP: 1.1 (ref 0.8–1.2)
INTERVENTRICULAR SEPTUM: 0.77 CM (ref 0.6–1.1)
IVRT: 68.51 MSEC
LA MAJOR: 4.7 CM
LA MINOR: 4.73 CM
LA WIDTH: 3.5 CM
LDLC SERPL CALC-MCNC: 84.4 MG/DL (ref 63–159)
LEFT ATRIUM SIZE: 3.43 CM
LEFT ATRIUM VOLUME INDEX: 25.3 ML/M2
LEFT ATRIUM VOLUME: 48.11 CM3
LEFT INTERNAL DIMENSION IN SYSTOLE: 3.19 CM (ref 2.1–4)
LEFT VENTRICLE DIASTOLIC VOLUME INDEX: 57.51 ML/M2
LEFT VENTRICLE DIASTOLIC VOLUME: 109.55 ML
LEFT VENTRICLE MASS INDEX: 64 G/M2
LEFT VENTRICLE SYSTOLIC VOLUME INDEX: 21.4 ML/M2
LEFT VENTRICLE SYSTOLIC VOLUME: 40.69 ML
LEFT VENTRICULAR INTERNAL DIMENSION IN DIASTOLE: 4.84 CM (ref 3.5–6)
LEFT VENTRICULAR MASS: 122.33 G
LV LATERAL E/E' RATIO: 7.08 M/S
LV SEPTAL E/E' RATIO: 13.14 M/S
LYMPHOCYTES # BLD AUTO: 1.4 K/UL (ref 1–4.8)
LYMPHOCYTES NFR BLD: 12.8 % (ref 18–48)
MAGNESIUM SERPL-MCNC: 1.7 MG/DL (ref 1.6–2.6)
MCH RBC QN AUTO: 29.5 PG (ref 27–31)
MCHC RBC AUTO-ENTMCNC: 35.4 G/DL (ref 32–36)
MCV RBC AUTO: 84 FL (ref 82–98)
MONOCYTES # BLD AUTO: 0.6 K/UL (ref 0.3–1)
MONOCYTES NFR BLD: 5.2 % (ref 4–15)
MV A" WAVE DURATION": 72 MSEC
MV MEAN GRADIENT: 1 MMHG
MV PEAK A VEL: 0.35 M/S
MV PEAK E VEL: 0.92 M/S
MV STENOSIS PRESSURE HALF TIME: 44.63 MS
MV VALVE AREA P 1/2 METHOD: 4.93 CM2
NEUTROPHILS # BLD AUTO: 9.1 K/UL (ref 1.8–7.7)
NEUTROPHILS NFR BLD: 80.9 % (ref 38–73)
NONHDLC SERPL-MCNC: 95 MG/DL
NRBC BLD-RTO: 0 /100 WBC
PHOSPHATE SERPL-MCNC: 4 MG/DL (ref 2.7–4.5)
PISA MRMAX VEL: 0.04 M/S
PISA TR MAX VEL: 2.89 M/S
PLATELET # BLD AUTO: 271 K/UL (ref 150–350)
PMV BLD AUTO: 9.4 FL (ref 9.2–12.9)
POCT GLUCOSE: 210 MG/DL (ref 70–110)
POCT GLUCOSE: 232 MG/DL (ref 70–110)
POTASSIUM SERPL-SCNC: 3.5 MMOL/L (ref 3.5–5.1)
PROT SERPL-MCNC: 7.7 G/DL (ref 6–8.4)
PROTHROMBIN TIME: 11.9 SEC (ref 9–12.5)
PULM VEIN A" WAVE DURATION": 84 MSEC
PV PEAK VELOCITY: 0.69 CM/S
RA MAJOR: 4.75 CM
RA PRESSURE: 8 MMHG
RA WIDTH: 2.9 CM
RBC # BLD AUTO: 5.08 M/UL (ref 4–5.4)
RIGHT VENTRICULAR END-DIASTOLIC DIMENSION: 3.23 CM
SINUS: 2.94 CM
SODIUM SERPL-SCNC: 136 MMOL/L (ref 136–145)
STJ: 2.68 CM
TDI LATERAL: 0.13 M/S
TDI SEPTAL: 0.07 M/S
TDI: 0.1 M/S
TR MAX PG: 33 MMHG
TRICUSPID ANNULAR PLANE SYSTOLIC EXCURSION: 2.69 CM
TRIGL SERPL-MCNC: 53 MG/DL (ref 30–150)
TROPONIN I SERPL DL<=0.01 NG/ML-MCNC: 0.02 NG/ML (ref 0–0.03)
TV REST PULMONARY ARTERY PRESSURE: 41 MMHG
WBC # BLD AUTO: 11.27 K/UL (ref 3.9–12.7)

## 2020-08-29 PROCEDURE — G0378 HOSPITAL OBSERVATION PER HR: HCPCS

## 2020-08-29 PROCEDURE — 84484 ASSAY OF TROPONIN QUANT: CPT

## 2020-08-29 PROCEDURE — 80053 COMPREHEN METABOLIC PANEL: CPT

## 2020-08-29 PROCEDURE — 80061 LIPID PANEL: CPT

## 2020-08-29 PROCEDURE — C9399 UNCLASSIFIED DRUGS OR BIOLOG: HCPCS | Performed by: NURSE PRACTITIONER

## 2020-08-29 PROCEDURE — 85730 THROMBOPLASTIN TIME PARTIAL: CPT

## 2020-08-29 PROCEDURE — 25000003 PHARM REV CODE 250: Performed by: NURSE PRACTITIONER

## 2020-08-29 PROCEDURE — 82553 CREATINE MB FRACTION: CPT

## 2020-08-29 PROCEDURE — 36415 COLL VENOUS BLD VENIPUNCTURE: CPT

## 2020-08-29 PROCEDURE — 83036 HEMOGLOBIN GLYCOSYLATED A1C: CPT

## 2020-08-29 PROCEDURE — 83735 ASSAY OF MAGNESIUM: CPT

## 2020-08-29 PROCEDURE — 82550 ASSAY OF CK (CPK): CPT

## 2020-08-29 PROCEDURE — 97161 PT EVAL LOW COMPLEX 20 MIN: CPT | Performed by: PHYSICAL THERAPIST

## 2020-08-29 PROCEDURE — 63600175 PHARM REV CODE 636 W HCPCS: Performed by: NURSE PRACTITIONER

## 2020-08-29 PROCEDURE — 95819 EEG AWAKE AND ASLEEP: CPT

## 2020-08-29 PROCEDURE — 95816 EEG AWAKE AND DROWSY: CPT | Mod: 26,,, | Performed by: PSYCHIATRY & NEUROLOGY

## 2020-08-29 PROCEDURE — 94761 N-INVAS EAR/PLS OXIMETRY MLT: CPT

## 2020-08-29 PROCEDURE — 85025 COMPLETE CBC W/AUTO DIFF WBC: CPT

## 2020-08-29 PROCEDURE — 85610 PROTHROMBIN TIME: CPT

## 2020-08-29 PROCEDURE — 84100 ASSAY OF PHOSPHORUS: CPT

## 2020-08-29 PROCEDURE — 92610 EVALUATE SWALLOWING FUNCTION: CPT

## 2020-08-29 PROCEDURE — 95816 PR EEG,W/AWAKE & DROWSY RECORD: ICD-10-PCS | Mod: 26,,, | Performed by: PSYCHIATRY & NEUROLOGY

## 2020-08-29 PROCEDURE — 96372 THER/PROPH/DIAG INJ SC/IM: CPT | Mod: 59

## 2020-08-29 PROCEDURE — 92523 SPEECH SOUND LANG COMPREHEN: CPT

## 2020-08-29 PROCEDURE — C9113 INJ PANTOPRAZOLE SODIUM, VIA: HCPCS | Performed by: NURSE PRACTITIONER

## 2020-08-29 PROCEDURE — 97116 GAIT TRAINING THERAPY: CPT | Performed by: PHYSICAL THERAPIST

## 2020-08-29 RX ORDER — CIPROFLOXACIN 250 MG/1
250 TABLET, FILM COATED ORAL EVERY 12 HOURS
Status: DISCONTINUED | OUTPATIENT
Start: 2020-08-29 | End: 2020-08-30 | Stop reason: HOSPADM

## 2020-08-29 RX ORDER — PANTOPRAZOLE SODIUM 40 MG/10ML
40 INJECTION, POWDER, LYOPHILIZED, FOR SOLUTION INTRAVENOUS DAILY
Status: DISCONTINUED | OUTPATIENT
Start: 2020-08-29 | End: 2020-08-30 | Stop reason: HOSPADM

## 2020-08-29 RX ORDER — IBUPROFEN 200 MG
24 TABLET ORAL
Status: DISCONTINUED | OUTPATIENT
Start: 2020-08-29 | End: 2020-08-30 | Stop reason: HOSPADM

## 2020-08-29 RX ORDER — LABETALOL HYDROCHLORIDE 5 MG/ML
10 INJECTION, SOLUTION INTRAVENOUS EVERY 6 HOURS PRN
Status: DISCONTINUED | OUTPATIENT
Start: 2020-08-29 | End: 2020-08-30 | Stop reason: HOSPADM

## 2020-08-29 RX ORDER — SODIUM CHLORIDE 0.9 % (FLUSH) 0.9 %
10 SYRINGE (ML) INJECTION
Status: DISCONTINUED | OUTPATIENT
Start: 2020-08-29 | End: 2020-08-30 | Stop reason: HOSPADM

## 2020-08-29 RX ORDER — ACETAMINOPHEN 10 MG/ML
1000 INJECTION, SOLUTION INTRAVENOUS ONCE
Status: COMPLETED | OUTPATIENT
Start: 2020-08-29 | End: 2020-08-29

## 2020-08-29 RX ORDER — PANTOPRAZOLE SODIUM 40 MG/1
40 TABLET, DELAYED RELEASE ORAL DAILY
Status: DISCONTINUED | OUTPATIENT
Start: 2020-08-29 | End: 2020-08-29

## 2020-08-29 RX ORDER — LATANOPROST 50 UG/ML
1 SOLUTION/ DROPS OPHTHALMIC NIGHTLY
Status: DISCONTINUED | OUTPATIENT
Start: 2020-08-29 | End: 2020-08-30 | Stop reason: HOSPADM

## 2020-08-29 RX ORDER — ASPIRIN 81 MG/1
81 TABLET ORAL DAILY
Status: DISCONTINUED | OUTPATIENT
Start: 2020-08-29 | End: 2020-08-30 | Stop reason: HOSPADM

## 2020-08-29 RX ORDER — ROSUVASTATIN CALCIUM 20 MG/1
20 TABLET, COATED ORAL DAILY
Status: DISCONTINUED | OUTPATIENT
Start: 2020-08-29 | End: 2020-08-30 | Stop reason: HOSPADM

## 2020-08-29 RX ORDER — ESCITALOPRAM OXALATE 10 MG/1
10 TABLET ORAL DAILY
Status: DISCONTINUED | OUTPATIENT
Start: 2020-08-29 | End: 2020-08-30 | Stop reason: HOSPADM

## 2020-08-29 RX ORDER — INSULIN ASPART 100 [IU]/ML
0-5 INJECTION, SOLUTION INTRAVENOUS; SUBCUTANEOUS
Status: DISCONTINUED | OUTPATIENT
Start: 2020-08-29 | End: 2020-08-30 | Stop reason: HOSPADM

## 2020-08-29 RX ORDER — GLUCAGON 1 MG
1 KIT INJECTION
Status: DISCONTINUED | OUTPATIENT
Start: 2020-08-29 | End: 2020-08-30 | Stop reason: HOSPADM

## 2020-08-29 RX ORDER — TIMOLOL MALEATE 5 MG/ML
1 SOLUTION/ DROPS OPHTHALMIC DAILY
Status: DISCONTINUED | OUTPATIENT
Start: 2020-08-29 | End: 2020-08-30 | Stop reason: HOSPADM

## 2020-08-29 RX ORDER — ATORVASTATIN CALCIUM 40 MG/1
40 TABLET, FILM COATED ORAL DAILY
Status: DISCONTINUED | OUTPATIENT
Start: 2020-08-29 | End: 2020-08-29

## 2020-08-29 RX ORDER — FERROUS SULFATE 325(65) MG
325 TABLET, DELAYED RELEASE (ENTERIC COATED) ORAL DAILY
Status: DISCONTINUED | OUTPATIENT
Start: 2020-08-29 | End: 2020-08-30 | Stop reason: HOSPADM

## 2020-08-29 RX ORDER — CIPROFLOXACIN 250 MG/1
250 TABLET, FILM COATED ORAL EVERY 12 HOURS
Qty: 10 TABLET | Refills: 0 | Status: SHIPPED | OUTPATIENT
Start: 2020-08-29 | End: 2020-09-03

## 2020-08-29 RX ORDER — PROMETHAZINE HYDROCHLORIDE 12.5 MG/1
25 TABLET ORAL EVERY 6 HOURS PRN
Status: DISCONTINUED | OUTPATIENT
Start: 2020-08-29 | End: 2020-08-30 | Stop reason: HOSPADM

## 2020-08-29 RX ORDER — IBUPROFEN 200 MG
16 TABLET ORAL
Status: DISCONTINUED | OUTPATIENT
Start: 2020-08-29 | End: 2020-08-30 | Stop reason: HOSPADM

## 2020-08-29 RX ORDER — ONDANSETRON 2 MG/ML
4 INJECTION INTRAMUSCULAR; INTRAVENOUS EVERY 6 HOURS PRN
Status: DISCONTINUED | OUTPATIENT
Start: 2020-08-29 | End: 2020-08-30 | Stop reason: HOSPADM

## 2020-08-29 RX ADMIN — INSULIN ASPART 1 UNITS: 100 INJECTION, SOLUTION INTRAVENOUS; SUBCUTANEOUS at 09:08

## 2020-08-29 RX ADMIN — LATANOPROST 1 DROP: 50 SOLUTION OPHTHALMIC at 03:08

## 2020-08-29 RX ADMIN — ONDANSETRON 4 MG: 2 INJECTION INTRAMUSCULAR; INTRAVENOUS at 11:08

## 2020-08-29 RX ADMIN — TIMOLOL MALEATE 1 DROP: 5 SOLUTION/ DROPS OPHTHALMIC at 11:08

## 2020-08-29 RX ADMIN — CIPROFLOXACIN HYDROCHLORIDE 250 MG: 250 TABLET, FILM COATED ORAL at 09:08

## 2020-08-29 RX ADMIN — INSULIN DETEMIR 30 UNITS: 100 INJECTION, SOLUTION SUBCUTANEOUS at 01:08

## 2020-08-29 RX ADMIN — INSULIN ASPART 3 UNITS: 100 INJECTION, SOLUTION INTRAVENOUS; SUBCUTANEOUS at 05:08

## 2020-08-29 RX ADMIN — PANTOPRAZOLE SODIUM 40 MG: 40 INJECTION, POWDER, LYOPHILIZED, FOR SOLUTION INTRAVENOUS at 01:08

## 2020-08-29 RX ADMIN — CEFTRIAXONE 1 G: 1 INJECTION, SOLUTION INTRAVENOUS at 03:08

## 2020-08-29 RX ADMIN — ONDANSETRON 4 MG: 2 INJECTION INTRAMUSCULAR; INTRAVENOUS at 12:08

## 2020-08-29 RX ADMIN — PROMETHAZINE HYDROCHLORIDE 25 MG: 12.5 TABLET ORAL at 03:08

## 2020-08-29 RX ADMIN — LATANOPROST 1 DROP: 50 SOLUTION OPHTHALMIC at 09:08

## 2020-08-29 RX ADMIN — ACETAMINOPHEN 1000 MG: 10 INJECTION, SOLUTION INTRAVENOUS at 02:08

## 2020-08-29 NOTE — PLAN OF CARE
Medications administered per orders  Nausea treated per orders  Vital signs monitored and treated per orders  Safe environment provided

## 2020-08-29 NOTE — PT/OT/SLP EVAL
"Physical Therapy Evaluation    Patient Name:  Maria T Lund   MRN:  [id number]    Recommendations:     Discharge Recommendations:  home with home health   Discharge Equipment Recommendations:     Barriers to discharge: None    Assessment:     Maria T Lund is a 75 y.o. female admitted with a medical diagnosis of CVA (cerebral vascular accident).  She presents with the following impairments/functional limitations:  weakness, impaired endurance, gait instability, impaired functional mobilty .    Rehab Prognosis: Fair; patient would benefit from acute skilled PT services to address these deficits and reach maximum level of function.    Recent Surgery: * No surgery found *      Plan:     During this hospitalization, patient to be seen 6 x/week to address the identified rehab impairments via gait training, therapeutic activities, therapeutic exercises and progress toward the following goals:    · Plan of Care Expires:       Subjective     Chief Complaint: Patient reports she has not eaten yet today  Patient/Family Comments/goals: "I know I can walk"  Pain/Comfort:  · Pain Rating 1: 0/10    Patients cultural, spiritual, Islam conflicts given the current situation:      Living Environment:    Prior to admission, patients level of function was independent.  Equipment used at home: rollator.  DME owned (not currently used): rollator.  Upon discharge, patient will have assistance from .    Objective:     Communicated with nursing prior to session.  Patient found supine with    upon PT entry to room.    General Precautions: Standard, fall   Orthopedic Precautions:    Braces:       Exams:  · RLE ROM: WFL  · RLE Strength: WNL  · LLE ROM: WNL  · LLE Strength: WNL    Functional Mobility:  · Bed Mobility:     · Supine to Sit: minimum assistance  · Transfers:     · Sit to Stand:  minimum assistance with rolling walker  · Gait: Patient ambulated 100 feet with FWW and CGA    Therapeutic Activities and " Exercises:       AM-PAC 6 CLICK MOBILITY  Total Score:16     Patient left supine with call button in reach, bed alarm on and  present.    GOALS:   Multidisciplinary Problems     Physical Therapy Goals        Problem: Physical Therapy Goal    Goal Priority Disciplines Outcome Goal Variances Interventions   Physical Therapy Goal     PT, PT/OT Ongoing, Progressing     Description: Goals to be met by: 2020     Patient will increase functional independence with mobility by performin. Supine to sit with Contact Guard Assistance  2. Sit to stand transfer with Contact Guard Assistance  3. Gait  x 250 feet with Contact Guard Assistance using Rolling Walker.                      History:     Past Medical History:   Diagnosis Date    A-fib     Anticoagulant long-term use     Arthritis     Breast cancer 2018    right breast    Diabetes mellitus     Gastric reflux     Glaucoma     Hyperlipemia     Hypertension        Past Surgical History:   Procedure Laterality Date    APPENDECTOMY      AV NODE ABLATION      x 2    CATARACT EXTRACTION Right     CHOLECYSTECTOMY      FOOT SURGERY Right     Achilles tendon replacement    HYSTERECTOMY      SENTINEL LYMPH NODE BIOPSY Right 3/14/2019    Procedure: BIOPSY, LYMPH NODE, SENTINEL;  Surgeon: Lisa Youssef MD;  Location: [facility];  Service: General;  Laterality: Right;    TONSILLECTOMY      WISDOM TOOTH EXTRACTION         Time Tracking:     PT Received On: 20  PT Start Time: 1030     PT Stop Time: 1053  PT Total Time (min): 23 min     Billable Minutes: Evaluation 12 and Gait Training 11      Saturnino Yi, PT  2020

## 2020-08-29 NOTE — ED NOTES
RN calls C, spoke with Daisy. Patient accepted to [facility] to Dr. Hunt/ Dr. Arceo to room 402. Number for report is [phone]. Daisy to arrange [facility] priority 2 transport.

## 2020-08-29 NOTE — PLAN OF CARE
(Physician in Lead of Transfers)   Outside Transfer Acceptance Note / Regional Referral Center      Transferring Physician: Josh Hdez MD    Accepting Physician: José Miguel Hunt MD    Date of Acceptance: 08/28/2020    Transferring Facility: [facility]    Reason for Transfer: higher level of care    Report from Transferring Physician/Hospital course:  Patient is a 75 y.o. female who has a past medical history of A-fib, Anticoagulant long-term use, Arthritis, Breast cancer, Diabetes mellitus, Gastric reflux, Glaucoma, Hyperlipemia, and Hypertension presented with altered mental status and difficulty speaking. On presentation to ED she was found confused with aphasia. No family available to give details and it is unclear what patients baseline is. Work up in ED was unrevealing including CBC, BMP, UA, urine tox screen. CT head is negative for any acute pathology. Patient does not appear to have any focal deficits. Hospitalist at referring facility requested transfer for neurology consult and MRI.       Vital Signs (Most Recent):  Temp: 98.1 °F (36.7 °C) (08/28/20 1556)  Pulse: 80 (08/28/20 1902)  Resp: 15 (08/28/20 1859)  BP: (!) 188/98 (08/28/20 1902)  SpO2: 96 % (08/28/20 1902) Vital Signs (24h Range):  Temp:  [98.1 °F (36.7 °C)] 98.1 °F (36.7 °C)  Pulse:  [72-94] 80  Resp:  [13-18] 15  SpO2:  [91 %-100 %] 96 %  BP: (175-188)/() 188/98       Labs & Radiographs: see EPIC    Significant Labs:   CMP:   Recent Labs   Lab 08/28/20  1615      K 3.4*   CL 98   CO2 25   *   BUN 23   CREATININE 0.5   CALCIUM 9.2   PROT 7.6   ALBUMIN 4.2   BILITOT 1.3*   ALKPHOS 61   AST 20   ALT 19   ANIONGAP 13   ESTGFRAFRICA >60.0   EGFRNONAA >60.0   , CBC:   Recent Labs   Lab 08/28/20  1615   WBC 8.67   HGB 14.7   HCT 42.3      , INR:   Recent Labs   Lab 08/28/20  1618   INR 1.2   , Lipid Panel No results for input(s): CHOL, HDL, LDLCALC, TRIG, CHOLHDL in the last 48 hours. and Troponin   Recent  Labs   Lab 08/28/20  1615   TROPONINI 0.01*       Significant Imaging:   X-Ray Chest AP Portable  Narrative: EXAMINATION:  XR CHEST AP PORTABLE    CLINICAL HISTORY:  Altered mental status, unspecified    TECHNIQUE:  Single frontal view of the chest was performed.    COMPARISON:  01/03/2019    FINDINGS:  The heart size is normal.  There is calcification of the aorta.  There is no consolidation or pleural effusion.  Surgical clips are present in the right upper quadrant.  Impression: No acute process.    Electronically signed by: Diony Rivers MD  Date:    08/28/2020  Time:    17:15  CT Head Without Contrast  Narrative: EXAMINATION:  CT HEAD WITHOUT CONTRAST    CLINICAL HISTORY:  Altered mental status;    TECHNIQUE:  Low dose axial images were obtained through the head.  Coronal and sagittal reformations were also performed. Contrast was not administered.    COMPARISON:  08/18/2014    FINDINGS:  There are involutional changes with normal size of the ventricular system. Periventricular and brainstem white matter hypoattenuation is nonspecific but suggestive of chronic microvascular ischemic change.  No mass, hemorrhage or acute major vascular distribution infarct. No mass effect or midline shift. Postsurgical change right globe.  Paranasal sinuses and mastoid air cells are clear. Atherosclerosis.  No acute osseous abnormality.  Impression: No acute intracranial abnormality.  Mild cerebral involutional change.  Between moderate to severe white matter disease, progressed in the interval.    Electronically signed by: Steven Herr  Date:    08/28/2020  Time:    17:11      To Do List:   1. Admit to   2. Consult neurology  3. Work up for AMS  4. Defer further imaging to neurology    José Miguel Hunt MD  Hospital Medicine Staff

## 2020-08-29 NOTE — PT/OT/SLP EVAL
Speech Language Pathology Evaluation  Cognitive/Bedside Swallow    Patient Name:  Maria T Lund   MRN:  [id number]  Admitting Diagnosis: CVA (cerebral vascular accident)    Recommendations:                  General Recommendations:  Cognitive-linguistic therapy  Diet recommendations:  Regular, Thin   Aspiration Precautions: Standard aspiration precautions   General Precautions: Standard, fall, hearing impaired  Communication strategies:  pt is hearing impaired    History:     Past Medical History:   Diagnosis Date    A-fib     Anticoagulant long-term use     Arthritis     Breast cancer 11/2018    right breast    Diabetes mellitus     Gastric reflux     Glaucoma     Hyperlipemia     Hypertension        Past Surgical History:   Procedure Laterality Date    APPENDECTOMY      AV NODE ABLATION      x 2    CATARACT EXTRACTION Right     CHOLECYSTECTOMY      FOOT SURGERY Right     Achilles tendon replacement    HYSTERECTOMY      SENTINEL LYMPH NODE BIOPSY Right 3/14/2019    Procedure: BIOPSY, LYMPH NODE, SENTINEL;  Surgeon: Lisa Youssef MD;  Location: [facility];  Service: General;  Laterality: Right;    TONSILLECTOMY      WISDOM TOOTH EXTRACTION         Social History: Patient lives with spouse.    Prior Intubation HX:  None this admission    Modified Barium Swallow: none in Epic, pt denied prior dysphagia    Chest X-Rays: none recent    MRI Brain - No acute intracranial abnormality.  Generalized cerebral volume loss with confluent and scattered areas of supratentorial and pontine white matter T2/FLAIR intensity, which are nonspecific and probably represent a severe degree of chronic microvascular ischemic change.     Prior diet: regular textures & liquids    Occupation/hobbies/homemaking: indep.    Subjective     I've been waiting for you.  Patient goals: home     Objective:     Cognitive Status:    Arousal/Alertness Appropriate response to stimuli  Attention Sustained attention deficit  moderate  Orientation Person, Situation and Time  Memory Immediate Recall mild deficit, Delayedmild deficit, long term recall intact and recall general information intact  Problem Solving Conclusions intact, Categories intact, Sequencing intact, Solutions intact and Compare/contrast intact  Simple calculation intact      Gm Cognitive Assessment (MoCA) score (adjusted for education level) -  21 out of 30 indicating mild cognitive-linguistic deficits    Receptive Language:/Comprehension: mild deficit with complex verbal & written info    Pragmatics:  intact    Expressive Language/Verbal:  Mild divergent naming deficit, occasional word finding deficit in conversation      Motor Speech:intact    Voice: intact    Visual-Spatial: mild visuospatial-construction deficit, no apparent neglect or field cuts    Reading: intact to moderately complex info     Written Expression: legible with dominant hand, unexpected spelling errors which she did not correct    Oral Musculature Evaluation  · Oral Musculature: WNL  · Dentition: present and adequate  · Secretion Management: adequate  · Mucosal Quality: good  · Mandibular Strength and Mobility: WNL  · Oral Labial Strength and Mobility: WNL  · Lingual Strength and Mobility: WNL  · Buccal Strength and Mobility: WNL  · Voice Prior to PO Intake: clear, no dysarthria    Bedside Swallow Eval:   Consistencies Assessed:  · Thin liquids via cup & straw sip, & 3 oz water challenge  · Puree via spoon  · Mixed consistencies pears in juice  · Solids cookie     Oral Phase:   · WNL    Pharyngeal Phase:   · no overt clinical signs/symptoms of aspiration  · no overt clinical signs/symptoms of pharyngeal dysphagia    Compensatory Strategies  · None    Treatment: education to pt & spouse re impressions & recommendations    Assessment:     Maria T Lund is a 75 y.o. female with an SLP diagnosis of mild Cognitive-Linguistic Impairment.  She should be referred for outpatient  cognitive-linguistic evaluation & tx after d/c.    Goals:   Multidisciplinary Problems     SLP Goals     Not on file                Plan:     · Patient to be seen:  3 x/week   · Plan of Care expires:     · Plan of Care reviewed with:  patient, spouse   · SLP Follow-Up:  Yes       Discharge recommendations:  Discharge Facility/Level of Care Needs: outpatient speech therapy   Barriers to Discharge:  None    Time Tracking:     SLP Treatment Date:   08/29/20  Speech Start Time:  1103  Speech Stop Time:  1153     Speech Total Time (min):  50 min    Billable Minutes: Eval 45  and Eval Swallow and Oral Function 5    Loreto Gaviria CCC-SLP/A  08/29/2020

## 2020-08-29 NOTE — PLAN OF CARE
SW completed assessment w/ pt and spouse at bedside.  Pt alert/oriented, able to verify info and answer questions appropriately.  Pt lives w/ spouse, reports independence w/ ADL & self care prior to admit.  Pt denies use of home health, uses rollator (& wheelchair) as needed.  Pt's spouse has healthcare POA, pt has living will, neither document [facility].  Pt will d/c home today w/ home health.  SW presented pt with home health resources, pt agrees to first available agency.  Disclosure signed.  Pharmacy is [facility] ([place]).         08/29/20 2381   Discharge Assessment   Assessment Type Discharge Planning Assessment   Confirmed/corrected address and phone number on facesheet? Yes   Assessment information obtained from? Patient;Caregiver   Communicated expected length of stay with patient/caregiver yes   Prior to hospitilization cognitive status: Alert/Oriented   Prior to hospitalization functional status: Independent   Current cognitive status: Alert/Oriented   Current Functional Status: Independent   Facility Arrived From: home   Lives With spouse   Able to Return to Prior Arrangements yes   Is patient able to care for self after discharge? Yes   Who are your caregiver(s) and their phone number(s)? spouse:  Alex Lund  [phone]   Patient's perception of discharge disposition home or selfcare   Readmission Within the Last 30 Days no previous admission in last 30 days   Patient currently being followed by outpatient case management? No   Patient currently receives any other outside agency services? No   Equipment Currently Used at Home rollator   Part D Coverage Yes   Do you have any problems affording any of your prescribed medications? No   Is the patient taking medications as prescribed? yes   Does the patient have transportation home? Yes   Transportation Anticipated family or friend will provide  (spouse)   Dialysis Name and Scheduled days N/A   Does the patient receive  services at the [facility]? No   Discharge Plan A Home Health;Home with family   Discharge Plan B Home Health;Home with family   DME Needed Upon Discharge  none   Patient/Family in Agreement with Plan yes

## 2020-08-29 NOTE — PLAN OF CARE
Pt clear for d/c with case management.         08/29/20 1546   Final Note   Assessment Type Final Discharge Note   Anticipated Discharge Disposition Home-Health  ([facility])   What phone number can be called within the next 1-3 days to see how you are doing after discharge? [phone]   Discharge plans and expectations educations in teach back method with documentation complete? Yes   Post-Acute Status   Post-Acute Authorization Home Health   Home Health Status Set-up Complete

## 2020-08-29 NOTE — ASSESSMENT & PLAN NOTE
Chronic; uncontrolled.  Hold chronic antihypertensives to allow for permissive HTN as in plan above.  Resume chronic antihypertensives when appropriate.

## 2020-08-29 NOTE — HPI
Maria T Lund is a 76 y/o female with a past medical history significant for A-fib, Anticoagulant long-term use, Arthritis, Breast cancer, Diabetes mellitus, Gastric reflux, Glaucoma, Hyperlipemia, and Hypertension presented with altered mental status and difficulty speaking. Per 's note, on presentation to ED at [facility], she was found confused with aphasia. No family available to give details and it is unclear what patients baseline is. Work up in ED was unrevealing including CBC, BMP, UA, urine tox screen. CT head is negative for any acute pathology. Patient does not appear to have any focal deficits. Pt is transferred to [facility] for further neurology evaluation.  Upon arrival, pt is awake and alert, in no acute distress. She's admitted to the service of hospital medicine for further treatment.

## 2020-08-29 NOTE — SUBJECTIVE & OBJECTIVE
Past Medical History:   Diagnosis Date    A-fib     Anticoagulant long-term use     Arthritis     Breast cancer 11/2018    right breast    Diabetes mellitus     Gastric reflux     Glaucoma     Hyperlipemia     Hypertension        Past Surgical History:   Procedure Laterality Date    APPENDECTOMY      AV NODE ABLATION      x 2    CATARACT EXTRACTION Right     CHOLECYSTECTOMY      FOOT SURGERY Right     Achilles tendon replacement    HYSTERECTOMY      SENTINEL LYMPH NODE BIOPSY Right 3/14/2019    Procedure: BIOPSY, LYMPH NODE, SENTINEL;  Surgeon: Lisa Youssef MD;  Location: [facility];  Service: General;  Laterality: Right;    TONSILLECTOMY      WISDOM TOOTH EXTRACTION         Review of patient's allergies indicates:   Allergen Reactions    Adhesive Rash       Current Facility-Administered Medications on File Prior to Encounter   Medication    [COMPLETED] hydrALAZINE injection 10 mg    [COMPLETED] ondansetron injection 4 mg     Current Outpatient Medications on File Prior to Encounter   Medication Sig    aspirin (ECOTRIN) 81 MG EC tablet Take 1 tablet (81 mg total) by mouth once daily. HOLDING FOR 4 DAYS PRIOR TO SURGERY    b complex vitamins tablet Take 1 tablet by mouth once daily.    cholecalciferol, vitamin D3, (VITAMIN D3) 1,000 unit capsule 1 capsule    escitalopram oxalate (LEXAPRO) 10 MG tablet Take 1 tablet (10 mg total) by mouth once daily.    ferrous sulfate (IRON) 325 mg (65 mg iron) Tab tablet Take 325 mg by mouth daily with breakfast.    Lactobac no.41/Bifidobact no.7 (PROBIOTIC-10 ORAL) Take by mouth.    LANTUS SOLOSTAR U-100 INSULIN glargine 100 units/mL (3mL) SubQ pen Inject 51 Units into the skin once daily.    latanoprost 0.005 % ophthalmic solution Place 1 drop into both eyes every evening. Take night before surgery    letrozole (FEMARA) 2.5 mg Tab Take 2.5 mg by mouth once daily .    losartan (COZAAR) 50 MG tablet Take 1 tablet (50 mg total) by mouth once  "daily. Hold morning of surgery    magnesium-calcium-folic acid 400-200-1 mg Tab Take by mouth.    metoprolol succinate (TOPROL-XL) 100 MG 24 hr tablet Take 1 tablet by mouth once daily.    PEN NEEDLE 31 gauge x 5/16" Ndle     RABEprazole (ACIPHEX) 20 mg tablet Take 1 tablet (20 mg total) by mouth once daily. Morning of surgery    rivaroxaban (XARELTO) 20 mg Tab Take 20 mg by mouth daily with dinner or evening meal. Hold per MD instructions     rosuvastatin (CRESTOR) 20 MG tablet Take 1 tablet by mouth once daily.    timolol (BETIMOL) 0.5 % ophthalmic solution Place 1 drop into both eyes once daily. Take morning of surgery     Family History     Problem Relation (Age of Onset)    Breast cancer Sister, Daughter (47)    Heart disease Mother        Tobacco Use    Smoking status: Never Smoker    Smokeless tobacco: Never Used   Substance and Sexual Activity    Alcohol use: No     Frequency: Never    Drug use: No    Sexual activity: Not on file     Review of Systems   Unable to perform ROS: Mental status change     Objective:     Vital Signs (Most Recent):  Temp: 97.6 °F (36.4 °C) (08/29/20 0004)  Pulse: 94 (08/29/20 0004)  Resp: 18 (08/29/20 0004)  BP: (!) 197/89 (08/29/20 0004)  SpO2: (!) 93 % (08/29/20 0047) Vital Signs (24h Range):  Temp:  [97.6 °F (36.4 °C)-98.1 °F (36.7 °C)] 97.6 °F (36.4 °C)  Pulse:  [] 94  Resp:  [10-20] 18  SpO2:  [91 %-100 %] 93 %  BP: (149-197)/() 197/89     Weight: 83.2 kg (183 lb 6.8 oz)  Body mass index is 30.52 kg/m².    Physical Exam  Vitals signs and nursing note reviewed.   Constitutional:       Appearance: Normal appearance. She is well-developed.   HENT:      Head: Normocephalic and atraumatic.      Mouth/Throat:      Mouth: Mucous membranes are dry.   Eyes:      Conjunctiva/sclera: Conjunctivae normal.      Pupils: Pupils are equal, round, and reactive to light.   Neck:      Musculoskeletal: Normal range of motion and neck supple.   Cardiovascular:      Rate and " Rhythm: Regular rhythm. Tachycardia present.      Pulses: Normal pulses.      Heart sounds: Normal heart sounds.   Pulmonary:      Effort: Pulmonary effort is normal. No respiratory distress.      Breath sounds: Normal breath sounds.   Abdominal:      General: Bowel sounds are normal. There is no distension.      Palpations: Abdomen is soft.      Tenderness: There is no abdominal tenderness.   Genitourinary:     Comments: deferred  Musculoskeletal: Normal range of motion.   Skin:     General: Skin is warm and dry.      Capillary Refill: Capillary refill takes 2 to 3 seconds.   Neurological:      Mental Status: She is alert. She is confused.      GCS: GCS eye subscore is 4. GCS verbal subscore is 4. GCS motor subscore is 6.      Comments: Difficulty word finding   Psychiatric:         Mood and Affect: Mood normal.         Behavior: Behavior normal.           CRANIAL NERVES     CN III, IV, VI   Pupils are equal, round, and reactive to light.       Significant Labs:   CBC:   Recent Labs   Lab 08/28/20  1615   WBC 8.67   HGB 14.7   HCT 42.3        CMP:   Recent Labs   Lab 08/28/20  1615      K 3.4*   CL 98   CO2 25   *   BUN 23   CREATININE 0.5   CALCIUM 9.2   PROT 7.6   ALBUMIN 4.2   BILITOT 1.3*   ALKPHOS 61   AST 20   ALT 19   ANIONGAP 13   EGFRNONAA >60.0     Troponin:   Recent Labs   Lab 08/28/20  1615   TROPONINI 0.01*     Urine Studies:   Recent Labs   Lab 08/28/20  1624   COLORU Yellow   APPEARANCEUA Clear   PHUR 7.0   SPECGRAV 1.025   PROTEINUA 1+*   GLUCUA 2+*   KETONESU Negative   BILIRUBINUA Negative   OCCULTUA Negative   NITRITE Negative   UROBILINOGEN 1.0   LEUKOCYTESUR Negative   RBCUA 0   WBCUA 10*   BACTERIA Moderate*   HYALINECASTS 3*       Significant Imaging: CT head: No acute intracranial abnormality.  Mild cerebral involutional change.  Between moderate to severe white matter disease, progressed in the interval.    CXR:  No acute process.

## 2020-08-29 NOTE — H&P
[facility] Hospital Medicine  History & Physical    Patient Name: Maria T Lund  MRN: [id number]  Admission Date: 8/28/2020  Attending Physician: Francesca Wyman MD   Primary Care Provider: Mikey Smith MD         Patient information was obtained from patient, past medical records and ER records.     Subjective:     Principal Problem:CVA (cerebral vascular accident)    Chief Complaint:   Chief Complaint   Patient presents with    Aphasia        HPI: Maria T Lund is a 74 y/o female with a past medical history significant for A-fib, Anticoagulant long-term use, Arthritis, Breast cancer, Diabetes mellitus, Gastric reflux, Glaucoma, Hyperlipemia, and Hypertension presented with altered mental status and difficulty speaking. Per 's note, on presentation to ED at [facility], she was found confused with aphasia. No family available to give details and it is unclear what patients baseline is. Work up in ED was unrevealing including CBC, BMP, UA, urine tox screen. CT head is negative for any acute pathology. Patient does not appear to have any focal deficits. Pt is transferred to [facility] for further neurology evaluation.  Upon arrival, pt is awake and alert, in no acute distress. She's admitted to the service of hospital medicine for further treatment.      Past Medical History:   Diagnosis Date    A-fib     Anticoagulant long-term use     Arthritis     Breast cancer 11/2018    right breast    Diabetes mellitus     Gastric reflux     Glaucoma     Hyperlipemia     Hypertension        Past Surgical History:   Procedure Laterality Date    APPENDECTOMY      AV NODE ABLATION      x 2    CATARACT EXTRACTION Right     CHOLECYSTECTOMY      FOOT SURGERY Right     Achilles tendon replacement    HYSTERECTOMY      SENTINEL LYMPH NODE BIOPSY Right 3/14/2019    Procedure: BIOPSY, LYMPH NODE, SENTINEL;  Surgeon: Lisa Youssef MD;  Location: [facility];  Service:  "General;  Laterality: Right;    TONSILLECTOMY      WISDOM TOOTH EXTRACTION         Review of patient's allergies indicates:   Allergen Reactions    Adhesive Rash       Current Facility-Administered Medications on File Prior to Encounter   Medication    [COMPLETED] hydrALAZINE injection 10 mg    [COMPLETED] ondansetron injection 4 mg     Current Outpatient Medications on File Prior to Encounter   Medication Sig    aspirin (ECOTRIN) 81 MG EC tablet Take 1 tablet (81 mg total) by mouth once daily. HOLDING FOR 4 DAYS PRIOR TO SURGERY    b complex vitamins tablet Take 1 tablet by mouth once daily.    cholecalciferol, vitamin D3, (VITAMIN D3) 1,000 unit capsule 1 capsule    escitalopram oxalate (LEXAPRO) 10 MG tablet Take 1 tablet (10 mg total) by mouth once daily.    ferrous sulfate (IRON) 325 mg (65 mg iron) Tab tablet Take 325 mg by mouth daily with breakfast.    Lactobac no.41/Bifidobact no.7 (PROBIOTIC-10 ORAL) Take by mouth.    LANTUS SOLOSTAR U-100 INSULIN glargine 100 units/mL (3mL) SubQ pen Inject 51 Units into the skin once daily.    latanoprost 0.005 % ophthalmic solution Place 1 drop into both eyes every evening. Take night before surgery    letrozole (FEMARA) 2.5 mg Tab Take 2.5 mg by mouth once daily .    losartan (COZAAR) 50 MG tablet Take 1 tablet (50 mg total) by mouth once daily. Hold morning of surgery    magnesium-calcium-folic acid 400-200-1 mg Tab Take by mouth.    metoprolol succinate (TOPROL-XL) 100 MG 24 hr tablet Take 1 tablet by mouth once daily.    PEN NEEDLE 31 gauge x 5/16" Ndle     RABEprazole (ACIPHEX) 20 mg tablet Take 1 tablet (20 mg total) by mouth once daily. Morning of surgery    rivaroxaban (XARELTO) 20 mg Tab Take 20 mg by mouth daily with dinner or evening meal. Hold per MD instructions     rosuvastatin (CRESTOR) 20 MG tablet Take 1 tablet by mouth once daily.    timolol (BETIMOL) 0.5 % ophthalmic solution Place 1 drop into both eyes once daily. Take morning of " surgery     Family History     Problem Relation (Age of Onset)    Breast cancer Sister, Daughter (47)    Heart disease Mother        Tobacco Use    Smoking status: Never Smoker    Smokeless tobacco: Never Used   Substance and Sexual Activity    Alcohol use: No     Frequency: Never    Drug use: No    Sexual activity: Not on file     Review of Systems   Unable to perform ROS: Mental status change     Objective:     Vital Signs (Most Recent):  Temp: 97.6 °F (36.4 °C) (08/29/20 0004)  Pulse: 94 (08/29/20 0004)  Resp: 18 (08/29/20 0004)  BP: (!) 197/89 (08/29/20 0004)  SpO2: (!) 93 % (08/29/20 0047) Vital Signs (24h Range):  Temp:  [97.6 °F (36.4 °C)-98.1 °F (36.7 °C)] 97.6 °F (36.4 °C)  Pulse:  [] 94  Resp:  [10-20] 18  SpO2:  [91 %-100 %] 93 %  BP: (149-197)/() 197/89     Weight: 83.2 kg (183 lb 6.8 oz)  Body mass index is 30.52 kg/m².    Physical Exam  Vitals signs and nursing note reviewed.   Constitutional:       Appearance: Normal appearance. She is well-developed.   HENT:      Head: Normocephalic and atraumatic.      Mouth/Throat:      Mouth: Mucous membranes are dry.   Eyes:      Conjunctiva/sclera: Conjunctivae normal.      Pupils: Pupils are equal, round, and reactive to light.   Neck:      Musculoskeletal: Normal range of motion and neck supple.   Cardiovascular:      Rate and Rhythm: Regular rhythm. Tachycardia present.      Pulses: Normal pulses.      Heart sounds: Normal heart sounds.   Pulmonary:      Effort: Pulmonary effort is normal. No respiratory distress.      Breath sounds: Normal breath sounds.   Abdominal:      General: Bowel sounds are normal. There is no distension.      Palpations: Abdomen is soft.      Tenderness: There is no abdominal tenderness.   Genitourinary:     Comments: deferred  Musculoskeletal: Normal range of motion.   Skin:     General: Skin is warm and dry.      Capillary Refill: Capillary refill takes 2 to 3 seconds.   Neurological:      Mental Status: She is  alert. She is confused.      GCS: GCS eye subscore is 4. GCS verbal subscore is 4. GCS motor subscore is 6.      Comments: Difficulty word finding   Psychiatric:         Mood and Affect: Mood normal.         Behavior: Behavior normal.           CRANIAL NERVES     CN III, IV, VI   Pupils are equal, round, and reactive to light.       Significant Labs:   CBC:   Recent Labs   Lab 08/28/20  1615   WBC 8.67   HGB 14.7   HCT 42.3        CMP:   Recent Labs   Lab 08/28/20  1615      K 3.4*   CL 98   CO2 25   *   BUN 23   CREATININE 0.5   CALCIUM 9.2   PROT 7.6   ALBUMIN 4.2   BILITOT 1.3*   ALKPHOS 61   AST 20   ALT 19   ANIONGAP 13   EGFRNONAA >60.0     Troponin:   Recent Labs   Lab 08/28/20  1615   TROPONINI 0.01*     Urine Studies:   Recent Labs   Lab 08/28/20  1624   COLORU Yellow   APPEARANCEUA Clear   PHUR 7.0   SPECGRAV 1.025   PROTEINUA 1+*   GLUCUA 2+*   KETONESU Negative   BILIRUBINUA Negative   OCCULTUA Negative   NITRITE Negative   UROBILINOGEN 1.0   LEUKOCYTESUR Negative   RBCUA 0   WBCUA 10*   BACTERIA Moderate*   HYALINECASTS 3*       Significant Imaging: CT head: No acute intracranial abnormality.  Mild cerebral involutional change.  Between moderate to severe white matter disease, progressed in the interval.    CXR:  No acute process.       Assessment/Plan:     * CVA (cerebral vascular accident)  Consult Neurology  Neuro checks q4h  Permissive HTN <220/<120 - will hold home antihypertensives and restart when clinically appropriate  MRI of brain  MRA head/neck  ECHO  Trend CBC, CMP, Mg and Phos  Fasting lipid panel  HgA1C  Consult PT/OT  Continue home ASA  DVT prophylaxis with ANNA's, SCD's.    Hold chronic xarelto until cleared by neurology to resume.  Aspiration precautions, fall precautions        Type 2 diabetes mellitus  Chronic; check hemoglobin A1c to assess control.  Continue long acting insulin at a reduced rate acutely and titrate as appropriate.  Accuchecks ac/hs with SSI  coverage as necessary.        Hyperlipidemia associated with type 2 diabetes mellitus  Chronic; continue chronic statin.      UTI (urinary tract infection)  IV rocephin.  Follow culture.      Paroxysmal A-fib  Patient with Paroxysmal atrial fibrillation which is controlled currently with Beta Blocker. [id number] score 5. Anticoagulation indicated. Anticoagulation done with xarelto; currently on hold 2/2 possible CVA.  Resume when OK with neurology..        Hypertension associated with diabetes  Chronic; uncontrolled.  Hold chronic antihypertensives to allow for permissive HTN as in plan above.  Resume chronic antihypertensives when appropriate.      VTE Risk Mitigation (From admission, onward)         Ordered     IP VTE HIGH RISK PATIENT  Once      08/29/20 0006     Place sequential compression device  Until discontinued      08/29/20 0006     Reason for No Pharmacological VTE Prophylaxis  Once     Question:  Reasons:  Answer:  Physician Provided (leave comment)    08/29/20 0006                   LEEROY Santacruz  Department of Hospital Medicine   [facility]

## 2020-08-29 NOTE — PLAN OF CARE
Problem: Adult Inpatient Plan of Care  Goal: Plan of Care Review  Outcome: Ongoing, Progressing  Flowsheets (Taken 8/29/2020 0417)  Plan of Care Reviewed With: patient  Pt AAOx2 (self and place), supine with HOB low, resting but responsive with bed alarm active and AVASYS at bedside.   VSS with NAD noted; pt free of injury or falls. Pt c/o headache upon arrival to unit; IV Acetaminophen provided since pt unable to pass bedside TAO and ingest PO medication for relief.  Some expressive aphasia noted during verbal exchange of nurse and pt; overall pt able to answer mos questions.  MRI and MRA to confirm and/or rule out CVA to be performed in AM. SCDs applied. Will continue to monitor.    Goal: Patient-Specific Goal (Individualization)  Outcome: Ongoing, Progressing  Flowsheets (Taken 8/29/2020 0417)  Individualized Care Needs: Safety, Neuro Checks, Blood Pressure  Anxieties, Fears or Concerns: My head hurts  Patient-Specific Goals (Include Timeframe): Pt will maintain permissive HTN with systolic BP <200 by end of day 8/29/20

## 2020-08-29 NOTE — PLAN OF CARE
Problem: Physical Therapy Goal  Goal: Physical Therapy Goal  Description: Goals to be met by: 2020     Patient will increase functional independence with mobility by performin. Supine to sit with Contact Guard Assistance  2. Sit to stand transfer with Contact Guard Assistance  3. Gait  x 250 feet with Contact Guard Assistance using Rolling Walker.     Outcome: Ongoing, Progressing

## 2020-08-29 NOTE — ED NOTES
RN receives call from [facility], patient to now to now go to room 211.    RN calls [facility] to call report, spoke with Tera.

## 2020-08-29 NOTE — ASSESSMENT & PLAN NOTE
Chronic; check hemoglobin A1c to assess control.  Continue long acting insulin at a reduced rate acutely and titrate as appropriate.  Accuchecks ac/hs with SSI coverage as necessary.

## 2020-08-29 NOTE — PLAN OF CARE
PERRY contacted [facility] ([facility]) for new home health referral [phone]. PRERY faxed orders to [phone].  Unable to send via [employer].         08/29/20 3762   Post-Acute Status   Post-Acute Authorization Home Health   Home Health Status Set-up Complete   Discharge Plan   Discharge Plan A Home Health;Home with family   Discharge Plan B Home Health;Home with family

## 2020-08-29 NOTE — ASSESSMENT & PLAN NOTE
Patient with Paroxysmal atrial fibrillation which is controlled currently with Beta Blocker. [id number] score 5. Anticoagulation indicated. Anticoagulation done with xarelto; currently on hold 2/2 possible CVA.  Resume when OK with neurology..

## 2020-08-29 NOTE — ASSESSMENT & PLAN NOTE
Consult Neurology  Neuro checks q4h  Permissive HTN <220/<120 - will hold home antihypertensives and restart when clinically appropriate  MRI of brain  MRA head/neck  ECHO  Trend CBC, CMP, Mg and Phos  Fasting lipid panel  HgA1C  Consult PT/OT  Continue home ASA  DVT prophylaxis with ANNA's, SCD's.    Hold chronic xarelto until cleared by neurology to resume.  Aspiration precautions, fall precautions

## 2020-08-30 VITALS
RESPIRATION RATE: 17 BRPM | HEIGHT: 65 IN | OXYGEN SATURATION: 92 % | HEART RATE: 112 BPM | TEMPERATURE: 98 F | BODY MASS INDEX: 30.49 KG/M2 | DIASTOLIC BLOOD PRESSURE: 67 MMHG | WEIGHT: 183 LBS | SYSTOLIC BLOOD PRESSURE: 127 MMHG

## 2020-08-30 LAB
ALBUMIN SERPL BCP-MCNC: 4.3 G/DL (ref 3.5–5.2)
ALP SERPL-CCNC: 67 U/L (ref 55–135)
ALT SERPL W/O P-5'-P-CCNC: 15 U/L (ref 10–44)
ANION GAP SERPL CALC-SCNC: 14 MMOL/L (ref 8–16)
AST SERPL-CCNC: 18 U/L (ref 10–40)
BASOPHILS # BLD AUTO: 0.05 K/UL (ref 0–0.2)
BASOPHILS NFR BLD: 0.5 % (ref 0–1.9)
BILIRUB SERPL-MCNC: 1.5 MG/DL (ref 0.1–1)
BUN SERPL-MCNC: 18 MG/DL (ref 8–23)
CALCIUM SERPL-MCNC: 10 MG/DL (ref 8.7–10.5)
CHLORIDE SERPL-SCNC: 100 MMOL/L (ref 95–110)
CO2 SERPL-SCNC: 28 MMOL/L (ref 23–29)
CREAT SERPL-MCNC: 0.8 MG/DL (ref 0.5–1.4)
DIFFERENTIAL METHOD: ABNORMAL
EOSINOPHIL # BLD AUTO: 0.1 K/UL (ref 0–0.5)
EOSINOPHIL NFR BLD: 0.8 % (ref 0–8)
ERYTHROCYTE [DISTWIDTH] IN BLOOD BY AUTOMATED COUNT: 13.5 % (ref 11.5–14.5)
EST. GFR  (AFRICAN AMERICAN): >60 ML/MIN/1.73 M^2
EST. GFR  (NON AFRICAN AMERICAN): >60 ML/MIN/1.73 M^2
FOLATE SERPL-MCNC: 30.3 NG/ML (ref 4–24)
GLUCOSE SERPL-MCNC: 138 MG/DL (ref 70–110)
HCT VFR BLD AUTO: 45.6 % (ref 37–48.5)
HGB BLD-MCNC: 15.5 G/DL (ref 12–16)
IMM GRANULOCYTES # BLD AUTO: 0.03 K/UL (ref 0–0.04)
IMM GRANULOCYTES NFR BLD AUTO: 0.3 % (ref 0–0.5)
LYMPHOCYTES # BLD AUTO: 1.7 K/UL (ref 1–4.8)
LYMPHOCYTES NFR BLD: 17.8 % (ref 18–48)
MCH RBC QN AUTO: 28.6 PG (ref 27–31)
MCHC RBC AUTO-ENTMCNC: 34 G/DL (ref 32–36)
MCV RBC AUTO: 84 FL (ref 82–98)
MONOCYTES # BLD AUTO: 1.2 K/UL (ref 0.3–1)
MONOCYTES NFR BLD: 12 % (ref 4–15)
NEUTROPHILS # BLD AUTO: 6.7 K/UL (ref 1.8–7.7)
NEUTROPHILS NFR BLD: 68.6 % (ref 38–73)
NRBC BLD-RTO: 0 /100 WBC
PLATELET # BLD AUTO: 268 K/UL (ref 150–350)
PMV BLD AUTO: 8.8 FL (ref 9.2–12.9)
POCT GLUCOSE: 144 MG/DL (ref 70–110)
POTASSIUM SERPL-SCNC: 3.5 MMOL/L (ref 3.5–5.1)
PROT SERPL-MCNC: 7.9 G/DL (ref 6–8.4)
RBC # BLD AUTO: 5.42 M/UL (ref 4–5.4)
SODIUM SERPL-SCNC: 142 MMOL/L (ref 136–145)
VIT B12 SERPL-MCNC: 768 PG/ML (ref 210–950)
WBC # BLD AUTO: 9.76 K/UL (ref 3.9–12.7)

## 2020-08-30 PROCEDURE — 96374 THER/PROPH/DIAG INJ IV PUSH: CPT

## 2020-08-30 PROCEDURE — 86592 SYPHILIS TEST NON-TREP QUAL: CPT

## 2020-08-30 PROCEDURE — 25000003 PHARM REV CODE 250: Performed by: NURSE PRACTITIONER

## 2020-08-30 PROCEDURE — 84425 ASSAY OF VITAMIN B-1: CPT

## 2020-08-30 PROCEDURE — 85025 COMPLETE CBC W/AUTO DIFF WBC: CPT

## 2020-08-30 PROCEDURE — 94761 N-INVAS EAR/PLS OXIMETRY MLT: CPT

## 2020-08-30 PROCEDURE — 36415 COLL VENOUS BLD VENIPUNCTURE: CPT

## 2020-08-30 PROCEDURE — G0378 HOSPITAL OBSERVATION PER HR: HCPCS

## 2020-08-30 PROCEDURE — 82746 ASSAY OF FOLIC ACID SERUM: CPT

## 2020-08-30 PROCEDURE — 94760 N-INVAS EAR/PLS OXIMETRY 1: CPT

## 2020-08-30 PROCEDURE — 84207 ASSAY OF VITAMIN B-6: CPT

## 2020-08-30 PROCEDURE — 63600175 PHARM REV CODE 636 W HCPCS: Performed by: NURSE PRACTITIONER

## 2020-08-30 PROCEDURE — G0180 MD CERTIFICATION HHA PATIENT: HCPCS | Mod: ,,, | Performed by: HOSPITALIST

## 2020-08-30 PROCEDURE — 82607 VITAMIN B-12: CPT

## 2020-08-30 PROCEDURE — G0180 PR HOME HEALTH MD CERTIFICATION: ICD-10-PCS | Mod: ,,, | Performed by: HOSPITALIST

## 2020-08-30 PROCEDURE — 80053 COMPREHEN METABOLIC PANEL: CPT

## 2020-08-30 PROCEDURE — 96372 THER/PROPH/DIAG INJ SC/IM: CPT | Mod: 59

## 2020-08-30 PROCEDURE — C9113 INJ PANTOPRAZOLE SODIUM, VIA: HCPCS | Performed by: NURSE PRACTITIONER

## 2020-08-30 RX ORDER — ONDANSETRON 8 MG/1
8 TABLET, ORALLY DISINTEGRATING ORAL EVERY 8 HOURS PRN
Qty: 12 TABLET | Refills: 0 | Status: SHIPPED | OUTPATIENT
Start: 2020-08-30

## 2020-08-30 RX ORDER — MICONAZOLE NITRATE 2 %
POWDER (GRAM) TOPICAL 2 TIMES DAILY
Status: DISCONTINUED | OUTPATIENT
Start: 2020-08-30 | End: 2020-08-30 | Stop reason: HOSPADM

## 2020-08-30 RX ORDER — ONDANSETRON 8 MG/1
8 TABLET, ORALLY DISINTEGRATING ORAL ONCE
Qty: 1 TABLET | Refills: 0 | Status: SHIPPED | OUTPATIENT
Start: 2020-08-30 | End: 2020-08-30 | Stop reason: SDUPTHER

## 2020-08-30 RX ADMIN — ESCITALOPRAM OXALATE 10 MG: 10 TABLET, FILM COATED ORAL at 08:08

## 2020-08-30 RX ADMIN — CIPROFLOXACIN HYDROCHLORIDE 250 MG: 250 TABLET, FILM COATED ORAL at 08:08

## 2020-08-30 RX ADMIN — MICONAZOLE NITRATE: 20 POWDER TOPICAL at 08:08

## 2020-08-30 RX ADMIN — TIMOLOL MALEATE 1 DROP: 5 SOLUTION/ DROPS OPHTHALMIC at 08:08

## 2020-08-30 RX ADMIN — INSULIN DETEMIR 30 UNITS: 100 INJECTION, SOLUTION SUBCUTANEOUS at 08:08

## 2020-08-30 RX ADMIN — ASPIRIN 81 MG: 81 TABLET, COATED ORAL at 08:08

## 2020-08-30 RX ADMIN — ROSUVASTATIN CALCIUM 20 MG: 20 TABLET, FILM COATED ORAL at 08:08

## 2020-08-30 RX ADMIN — FERROUS SULFATE TAB EC 325 MG (65 MG FE EQUIVALENT) 325 MG: 325 (65 FE) TABLET DELAYED RESPONSE at 08:08

## 2020-08-30 RX ADMIN — PANTOPRAZOLE SODIUM 40 MG: 40 INJECTION, POWDER, LYOPHILIZED, FOR SOLUTION INTRAVENOUS at 08:08

## 2020-08-30 NOTE — PLAN OF CARE
Recommendation:    1. Diet with texture as tolerated -diabetic/cardiac restrictions appropriate   2. Encourage adequate intake of meals   3. If intake <50% please provide Boost Glucose BID    Interventions:  Sodium modified diet- cardiac diet  Carbohydrate modified diet  Collaboration with other providers    Goals: intake at least 75% of meals by f/u  Nutrition Goal Status: new  Nutrition Discharge Plan: cardiac/diabetic diet

## 2020-08-30 NOTE — PROGRESS NOTES
[facility] Hospital Medicine  Progress Note    Patient Name: Maria T Lund  MRN: [id number]  Patient Class: OP- Observation   Admission Date: 8/28/2020  Length of Stay: 1 days  Attending Physician: Francesca Wyman MD  Primary Care Provider: Mikey Smith MD        Subjective:     Principal Problem:CVA (cerebral vascular accident)        HPI:  Maria T Lund is a 74 y/o female with a past medical history significant for A-fib, Anticoagulant long-term use, Arthritis, Breast cancer, Diabetes mellitus, Gastric reflux, Glaucoma, Hyperlipemia, and Hypertension presented with altered mental status and difficulty speaking. Per 's note, on presentation [place] [facility], she was found confused with aphasia. No family available to give details and it is unclear what patients baseline is. Work up in ED was unrevealing including CBC, BMP, UA, urine tox screen. CT head is negative for any acute pathology. Patient does not appear to have any focal deficits. Pt is transferred to [facility] for further neurology evaluation.  Upon arrival, pt is awake and alert, in no acute distress. She's admitted to the service of hospital medicine for further treatment.      Overview/Hospital Course:  No notes on file    Interval History: patient at baseline mental status this am. Ambulated with PT.   at bedside.   MRI negative for acute process. Awaiting neurology clearance.     Review of Systems   Constitutional: Positive for fatigue. Negative for diaphoresis and fever.   HENT: Negative for congestion and facial swelling.    Eyes: Negative for photophobia.   Respiratory: Negative for cough and shortness of breath.    Cardiovascular: Negative for chest pain and leg swelling.   Gastrointestinal: Negative for abdominal distention and abdominal pain.   Genitourinary: Negative for dyspareunia and flank pain.   Musculoskeletal: Negative for arthralgias and gait problem.   Skin: Negative for rash.    Neurological: Positive for weakness (generalized). Negative for speech difficulty and numbness.     Objective:     Vital Signs (Most Recent):  Temp: 97.2 °F (36.2 °C) (08/30/20 0332)  Pulse: 102 (08/30/20 0332)  Resp: 16 (08/30/20 0332)  BP: (!) 142/70 (08/30/20 0332)  SpO2: (!) 93 % (08/30/20 0332) Vital Signs (24h Range):  Temp:  [96.2 °F (35.7 °C)-98.4 °F (36.9 °C)] 97.2 °F (36.2 °C)  Pulse:  [] 102  Resp:  [16-18] 16  SpO2:  [92 %-95 %] 93 %  BP: (140-192)/(70-92) 142/70     Weight: 83 kg (183 lb)  Body mass index is 30.45 kg/m².    Intake/Output Summary (Last 24 hours) at 8/30/2020 0715  Last data filed at 8/29/2020 1730  Gross per 24 hour   Intake 530 ml   Output --   Net 530 ml      Physical Exam  Vitals signs and nursing note reviewed.   Constitutional:       Appearance: Normal appearance. She is well-developed.   HENT:      Head: Normocephalic and atraumatic.      Right Ear: External ear normal.      Left Ear: External ear normal.      Mouth/Throat:      Mouth: Mucous membranes are moist.   Eyes:      Conjunctiva/sclera: Conjunctivae normal.      Pupils: Pupils are equal, round, and reactive to light.   Neck:      Musculoskeletal: Normal range of motion and neck supple.   Cardiovascular:      Rate and Rhythm: Normal rate and regular rhythm.      Heart sounds: Murmur present.      Comments: Tele NSR  Pulmonary:      Effort: Pulmonary effort is normal.      Breath sounds: Normal breath sounds. No wheezing.   Abdominal:      General: Bowel sounds are normal.      Palpations: Abdomen is soft.      Tenderness: There is no abdominal tenderness.   Musculoskeletal: Normal range of motion.   Skin:     General: Skin is warm and dry.   Neurological:      General: No focal deficit present.      Mental Status: She is alert and oriented to person, place, and time.      Motor: Weakness (generalized) present.   Psychiatric:         Behavior: Behavior normal.         Judgment: Judgment normal.         Significant  Labs:   BMP:   Recent Labs   Lab 08/29/20  0627 08/30/20  0541   * 138*    142   K 3.5 3.5   CL 97 100   CO2 25 28   BUN 18 18   CREATININE 0.7 0.8   CALCIUM 9.5 10.0   MG 1.7  --      CBC:   Recent Labs   Lab 08/28/20  1615 08/29/20  0627 08/30/20  0541   WBC 8.67 11.27 9.76   HGB 14.7 15.0 15.5   HCT 42.3 42.4 45.6    271 268       Significant Imaging: I have reviewed and interpreted all pertinent imaging results/findings within the past 24 hours.      Assessment/Plan:      * CVA (cerebral vascular accident)  Consult Neurology  Neuro checks q4h  Permissive HTN <220/<120 - will hold home antihypertensives and restart when clinically appropriate  MRI of brain  MRA head/neck  ECHO  Trend CBC, CMP, Mg and Phos  Fasting lipid panel  HgA1C  Consult PT/OT  Continue home ASA  DVT prophylaxis with ANNA's, SCD's.    Hold chronic xarelto until cleared by neurology to resume.  Aspiration precautions, fall precautions        Type 2 diabetes mellitus  Chronic; check hemoglobin A1c to assess control.  Continue long acting insulin at a reduced rate acutely and titrate as appropriate.  Accuchecks ac/hs with SSI coverage as necessary.        Hyperlipidemia associated with type 2 diabetes mellitus  Chronic; continue chronic statin.      UTI (urinary tract infection)  IV rocephin.  Follow culture.  WBC 10. DC IV abx, change to oral        Paroxysmal A-fib  Patient with Paroxysmal atrial fibrillation which is controlled currently with Beta Blocker. [id number] score 5. Anticoagulation indicated. Anticoagulation done with xarelto; currently on hold 2/2 possible CVA.  Resume when OK with neurology..      SR on telemetry       Hypertension associated with diabetes  Chronic; uncontrolled.  Hold chronic antihypertensives to allow for permissive HTN as in plan above.  Resume chronic antihypertensives when appropriate.        VTE Risk Mitigation (From admission, onward)         Ordered     IP VTE HIGH RISK PATIENT  Once       08/29/20 0006     Place sequential compression device  Until discontinued      08/29/20 0006     Reason for No Pharmacological VTE Prophylaxis  Once     Question:  Reasons:  Answer:  Physician Provided (leave comment)    08/29/20 0006                Discharge Planning   NGOZI: 8/30/2020     Code Status: Full Code   Is the patient medically ready for discharge?:     Reason for patient still in hospital (select all that apply): Patient trending condition and Consult recommendations  Discharge Plan A: Home Health, Home with family                  Melissa Pringle NP  Department of Hospital Medicine   [facility]

## 2020-08-30 NOTE — PLAN OF CARE
Patient alert and oriented resting in bed. NAD. Denies pain or SOB. VSS. Brief for incontinence. Normal Sr. Room air. Bed alarm active. Plan of care reviewed with patient. Verbalizes understanding.Call light in reach. Pt free from fall or injury. Will monitor.

## 2020-08-30 NOTE — RESPIRATORY THERAPY
08/29/20 2027   Patient Assessment/Suction   Level of Consciousness (AVPU) alert   PRE-TX-O2   O2 Device (Oxygen Therapy) room air   SpO2 (!) 94 %   Pulse Oximetry Type Intermittent

## 2020-08-30 NOTE — SUBJECTIVE & OBJECTIVE
Interval History: patient at baseline mental status this am. Ambulated with PT.   at bedside.   MRI negative for acute process. Awaiting neurology clearance.     Review of Systems   Constitutional: Positive for fatigue. Negative for diaphoresis and fever.   HENT: Negative for congestion and facial swelling.    Eyes: Negative for photophobia.   Respiratory: Negative for cough and shortness of breath.    Cardiovascular: Negative for chest pain and leg swelling.   Gastrointestinal: Negative for abdominal distention and abdominal pain.   Genitourinary: Negative for dyspareunia and flank pain.   Musculoskeletal: Negative for arthralgias and gait problem.   Skin: Negative for rash.   Neurological: Positive for weakness (generalized). Negative for speech difficulty and numbness.     Objective:     Vital Signs (Most Recent):  Temp: 97.2 °F (36.2 °C) (08/30/20 0332)  Pulse: 102 (08/30/20 0332)  Resp: 16 (08/30/20 0332)  BP: (!) 142/70 (08/30/20 0332)  SpO2: (!) 93 % (08/30/20 0332) Vital Signs (24h Range):  Temp:  [96.2 °F (35.7 °C)-98.4 °F (36.9 °C)] 97.2 °F (36.2 °C)  Pulse:  [] 102  Resp:  [16-18] 16  SpO2:  [92 %-95 %] 93 %  BP: (140-192)/(70-92) 142/70     Weight: 83 kg (183 lb)  Body mass index is 30.45 kg/m².    Intake/Output Summary (Last 24 hours) at 8/30/2020 0715  Last data filed at 8/29/2020 1730  Gross per 24 hour   Intake 530 ml   Output --   Net 530 ml      Physical Exam  Vitals signs and nursing note reviewed.   Constitutional:       Appearance: Normal appearance. She is well-developed.   HENT:      Head: Normocephalic and atraumatic.      Right Ear: External ear normal.      Left Ear: External ear normal.      Mouth/Throat:      Mouth: Mucous membranes are moist.   Eyes:      Conjunctiva/sclera: Conjunctivae normal.      Pupils: Pupils are equal, round, and reactive to light.   Neck:      Musculoskeletal: Normal range of motion and neck supple.   Cardiovascular:      Rate and Rhythm: Normal rate  and regular rhythm.      Heart sounds: Murmur present.      Comments: Tele NSR  Pulmonary:      Effort: Pulmonary effort is normal.      Breath sounds: Normal breath sounds. No wheezing.   Abdominal:      General: Bowel sounds are normal.      Palpations: Abdomen is soft.      Tenderness: There is no abdominal tenderness.   Musculoskeletal: Normal range of motion.   Skin:     General: Skin is warm and dry.   Neurological:      General: No focal deficit present.      Mental Status: She is alert and oriented to person, place, and time.      Motor: Weakness (generalized) present.   Psychiatric:         Behavior: Behavior normal.         Judgment: Judgment normal.         Significant Labs:   BMP:   Recent Labs   Lab 08/29/20  0627 08/30/20  0541   * 138*    142   K 3.5 3.5   CL 97 100   CO2 25 28   BUN 18 18   CREATININE 0.7 0.8   CALCIUM 9.5 10.0   MG 1.7  --      CBC:   Recent Labs   Lab 08/28/20  1615 08/29/20  0627 08/30/20  0541   WBC 8.67 11.27 9.76   HGB 14.7 15.0 15.5   HCT 42.3 42.4 45.6    271 268       Significant Imaging: I have reviewed and interpreted all pertinent imaging results/findings within the past 24 hours.

## 2020-08-30 NOTE — CONSULTS
"  [facility]  Adult Nutrition  Consult Note    SUMMARY     Recommendations    Recommendation:    1. Diet with texture as tolerated -diabetic/cardiac restrictions appropriate   2. Encourage adequate intake of meals   3. If intake <50% please provide Boost Glucose BID    Interventions:  Sodium modified diet- cardiac diet  Carbohydrate modified diet  Collaboration with other providers    Goals: intake at least 75% of meals by f/u  Nutrition Goal Status: new  Communication of RD Recs: (POC)    Reason for Assessment    Reason For Assessment: consult  Diagnosis: stroke/CVA  Relevant Medical History: DM, gastric reflux, HLD, HTN    General Information Comments: Remote assessmnet completed, NFPE unable to be perfomed. Consult for stroke pathway. Pt consuming 50% of meals. No signs of significant weight loss a tthis time. She was feeling nausea with no vomiting.     Nutrition Discharge Planning: cardiac/diabetic    Nutrition Risk Screen    Nutrition Risk Screen: dysphagia or difficulty swallowing    Nutrition/Diet History    Food Allergies: NKFA  Factors Affecting Nutritional Intake: difficulty/impaired swallowing    Anthropometrics    Temp: 98.4 °F (36.9 °C)  Height Method: Stated  Height: 5' 5"  Height (inches): 65 in  Weight Method: Bed Scale  Weight: 83 kg (183 lb)  Weight (lb): 183 lb  Ideal Body Weight (IBW), Female: 125 lb  % Ideal Body Weight, Female (lb): 146.4 %  BMI (Calculated): 30.5  BMI Grade: 30 - 34.9- obesity - grade I       Lab/Procedures/Meds    Pertinent Labs Reviewed: reviewed  Pertinent Labs Comments: glucose 138, bilirubin total 1.5  Pertinent Medications Reviewed: reviewed  Pertinent Medications Comments: ferrous sulfate, insulin, pantoprazole, rosuvastatin    Estimated/Assessed Needs    Weight Used For Calorie Calculations: 83 kg (182 lb 15.7 oz)  Energy Calorie Requirements (kcal): 1662  Energy Need Method: Tucker-St Jeor(x1.25)  Protein Requirements: 66-83 g(.8-1 g/kg)  Weight " Used For Protein Calculations: 83 kg (182 lb 15.7 oz)        RDA Method (mL): 1662  CHO Requirement: 207 g      Nutrition Prescription Ordered    Current Diet Order: Diabetic/Cardiac Diet      Evaluation of Received Nutrient/Fluid Intake    I/O: reviewed  Fluid Required: meeting needs  % Intake of Estimated Energy Needs: 50 - 75 %  % Meal Intake: 50 - 75 %    Nutrition Risk    Level of Risk/Frequency of Follow-up: low - moderate     Assessment and Plan  Nutrition Problem:  Decreased nutrient needs; sodium    Related to (etiology):   CVA    Signs and Symptoms (as evidenced by):   CVA and hx HTN    Interventions:  Sodium modified diet- cardiac diet  Carbohydrate modified diet  Collaboration with other providers    Nutrition Diagnosis Status:   New  Monitor and Evaluation    Food and Nutrient Intake: energy intake, food and beverage intake  Food and Nutrient Adminstration: diet order  Physical Activity and Function: nutrition-related ADLs and IADLs  Anthropometric Measurements: weight, weight change  Biochemical Data, Medical Tests and Procedures: electrolyte and renal panel, glucose/endocrine profile  Nutrition-Focused Physical Findings: overall appearance     Malnutrition Assessment  NFPE not completed 2/2 RD working remotely    Nutrition Follow-Up    RD Follow-up?: Yes

## 2020-08-30 NOTE — ASSESSMENT & PLAN NOTE
Patient with Paroxysmal atrial fibrillation which is controlled currently with Beta Blocker. [id number] score 5. Anticoagulation indicated. Anticoagulation done with xarelto; currently on hold 2/2 possible CVA.  Resume when OK with neurology..      SR on telemetry

## 2020-08-30 NOTE — PROCEDURES
Date of service  08/29/2020     Introduction  Electroencephalographic (EEG) recording is performed with electrodes placed according to the International 10-20 placement system. Thirty two (32) channels of digital signal (sampling rate of 512/sec) including T1 and T2 was simultaneously recorded from the scalp and may include EKG, EMG, and/or eye monitors. Recording band pass was 0.1 to 512 Hz. Digital video recording of the patient is simultaneously recorded with the EEG. The patient is instructed to report clinical symptoms which may occur during the recording session. EEG and video recording is stored and archived in digital format. Activation procedures which include photic stimulation, hyperventilation and instructing patients to perform simple tasks are done in selected patients.    The EEG is displayed on a monitor screen and can be reviewed using different montages. Computer assisted analysis is employed to detect spike and electrographic seizure activity. The entire record is submitted for computer analysis. The entire recording is visually reviewed and, the times identified by computer analysis as being spikes or seizures are reviewed again.     Compressed spectral analysis (CSA) is also performed on the activity recorded from each individual channel. This is displayed as a power display of frequencies from 0 to 30 Hz over time. The CSA is reviewed looking for asymmetries in power between homologous areas of the scalp and then compared with the original EEG recording.     Findings  The patient's background consists of diffuse slowing, with predominantly theta range frequencies appreciated.  There is no focality to the slowing.  There are no focal, lateralized, or epileptiform transients.  No electrographic seizures are seen.    Normal sleep architecture is not noted.    Hyperventilation was not performed.  Photic stimulation did not induce pathologic changes in the EEG.    EKG demonstrates sinus rhythm with  some features suggestive of a bundle branch block.  This could be better delineated with a dedicated 12 lead EKG if clinically indicated.    Interpretation  This is an abnormal EEG due to the presence of diffuse slowing as described above.  These findings are indicative of a mild to moderate encephalopathy, though they are not specific for a particular underlying etiology.

## 2020-08-30 NOTE — CONSULTS
[facility]  Neurology  Consult Note    Patient Name: Maria T Lund  MRN: [id number]  Admission Date: 8/28/2020  Hospital Length of Stay: 1 days  Code Status: Full Code   Attending Provider: Dr Wyman  Consulting Provider: Dr Victor  Primary Care Physician: Mikey Smith MD  Principal Problem:CVA (cerebral vascular accident)    Consults  Subjective:     Chief Complaint:   Aphasia         HPI: Maria T Lund is a 76 y/o female with a past medical history significant for A-fib, Anticoagulant long-term use, Arthritis, Breast cancer, Diabetes mellitus, Gastric reflux, Glaucoma, Hyperlipemia, and Hypertension presented with altered mental status and difficulty speaking. Per 's note, [place] [facility] [place] was found confused with aphasia. No family available to give details and it is unclear what patients baseline is. Work up in ED was unrevealing including CBC, BMP, UA, urine tox screen. CT head is negative for any acute pathology. Patient does not appear to have any focal deficits. Pt is transferred to [facility] for further neurology evaluation.  Upon arrival, pt is awake and alert, in no acute distress. She's admitted to the service of hospital medicine for further treatment.    Neurological Consult:  Patient seen and examined.  Plan of care discussed with Dr. Victor.  The patient had altered mental status and confusion yesterday.   at bedside to give history.  He said that they started out with the patient having a headache and she was tired.  Then she became more confused throughout the day.  Patient and  deny that this has ever happened.  Patient denies history of seizures.  She reports she does take Xarelto as scheduled.  She says she does not take aspirin.  Patient has bilateral intentional tremors which patient reports is new.  She is almost at baseline per her .  Will need EEG carotid ultrasound added to workup.  MRI  negative.    Past Medical History:   Diagnosis Date    A-fib     Anticoagulant long-term use     Arthritis     Breast cancer 11/2018    right breast    Diabetes mellitus     Gastric reflux     Glaucoma     Hyperlipemia     Hypertension        Past Surgical History:   Procedure Laterality Date    APPENDECTOMY      AV NODE ABLATION      x 2    CATARACT EXTRACTION Right     CHOLECYSTECTOMY      FOOT SURGERY Right     Achilles tendon replacement    HYSTERECTOMY      SENTINEL LYMPH NODE BIOPSY Right 3/14/2019    Procedure: BIOPSY, LYMPH NODE, SENTINEL;  Surgeon: Lisa Youssef MD;  Location: [facility];  Service: General;  Laterality: Right;    TONSILLECTOMY      WISDOM TOOTH EXTRACTION         Review of patient's allergies indicates:   Allergen Reactions    Adhesive Rash       Current Neurological Medications:  Xarelto    No current facility-administered medications on file prior to encounter.      Current Outpatient Medications on File Prior to Encounter   Medication Sig    aspirin (ECOTRIN) 81 MG EC tablet Take 1 tablet (81 mg total) by mouth once daily. HOLDING FOR 4 DAYS PRIOR TO SURGERY    b complex vitamins tablet Take 1 tablet by mouth once daily.    cholecalciferol, vitamin D3, (VITAMIN D3) 1,000 unit capsule 1 capsule    escitalopram oxalate (LEXAPRO) 10 MG tablet Take 1 tablet (10 mg total) by mouth once daily.    ferrous sulfate (IRON) 325 mg (65 mg iron) Tab tablet Take 325 mg by mouth daily with breakfast.    Lactobac no.41/Bifidobact no.7 (PROBIOTIC-10 ORAL) Take by mouth.    LANTUS SOLOSTAR U-100 INSULIN glargine 100 units/mL (3mL) SubQ pen Inject 51 Units into the skin once daily.    latanoprost 0.005 % ophthalmic solution Place 1 drop into both eyes every evening. Take night before surgery    letrozole (FEMARA) 2.5 mg Tab Take 2.5 mg by mouth once daily .    losartan (COZAAR) 50 MG tablet Take 1 tablet (50 mg total) by mouth once daily. Hold morning of surgery     "magnesium-calcium-folic acid 400-200-1 mg Tab Take by mouth.    metoprolol succinate (TOPROL-XL) 100 MG 24 hr tablet Take 1 tablet by mouth once daily.    PEN NEEDLE 31 gauge x 5/16" Ndle     RABEprazole (ACIPHEX) 20 mg tablet Take 1 tablet (20 mg total) by mouth once daily. Morning of surgery    rivaroxaban (XARELTO) 20 mg Tab Take 20 mg by mouth daily with dinner or evening meal. Hold per MD instructions     rosuvastatin (CRESTOR) 20 MG tablet Take 1 tablet by mouth once daily.    timolol (BETIMOL) 0.5 % ophthalmic solution Place 1 drop into both eyes once daily. Take morning of surgery      Family History     Problem Relation (Age of Onset)    Breast cancer Sister, Daughter (47)    Heart disease Mother        Tobacco Use    Smoking status: Never Smoker    Smokeless tobacco: Never Used   Substance and Sexual Activity    Alcohol use: No     Frequency: Never    Drug use: No    Sexual activity: Not on file     Review of Systems   Constitutional: Positive for fatigue.   HENT: Negative.    Eyes: Negative.  Negative for visual disturbance.   Respiratory: Negative.    Cardiovascular: Negative.    Gastrointestinal: Negative.    Endocrine: Negative.    Genitourinary: Negative.    Musculoskeletal: Negative.  Negative for back pain and neck pain.   Skin: Negative.    Allergic/Immunologic: Negative.    Neurological: Positive for speech difficulty.   Hematological: Negative.    Psychiatric/Behavioral: Positive for confusion.     Objective:     Vital Signs (Most Recent):  Temp: 98.4 °F (36.9 °C) (08/29/20 1929)  Pulse: 107 (08/29/20 1929)  Resp: 16 (08/29/20 1929)  BP: (!) 155/75 (08/29/20 1929)  SpO2: (!) 92 % (08/29/20 1929) Vital Signs (24h Range):  Temp:  [96.2 °F (35.7 °C)-98.4 °F (36.9 °C)] 98.4 °F (36.9 °C)  Pulse:  [] 107  Resp:  [10-20] 16  SpO2:  [92 %-96 %] 92 %  BP: (140-197)/(75-92) 155/75     Weight: 83 kg (183 lb)  Body mass index is 30.45 kg/m².    Physical Exam  HENT:      Head: Normocephalic. "      Nose: Nose normal.      Mouth/Throat:      Mouth: Mucous membranes are moist.   Eyes:      Extraocular Movements: Extraocular movements intact.      Pupils: Pupils are equal, round, and reactive to light.   Neck:      Musculoskeletal: Normal range of motion.   Cardiovascular:      Rate and Rhythm: Normal rate.      Pulses: Normal pulses.   Pulmonary:      Effort: Pulmonary effort is normal.   Musculoskeletal: Normal range of motion.   Skin:     General: Skin is warm and dry.   Neurological:      Mental Status: She is alert and oriented to person, place, and time.      GCS: GCS eye subscore is 4. GCS verbal subscore is 5. GCS motor subscore is 6.      Motor: Weakness present.   Psychiatric:         Mood and Affect: Mood normal.         Behavior: Behavior normal.         NEUROLOGICAL EXAMINATION:     MENTAL STATUS   Oriented to person, place, and time.   Follows 1 step commands.   Speech: (Slightly slurred)  Level of consciousness: alert  Able to name object. Able to repeat.     CRANIAL NERVES     CN II   Visual fields full to confrontation.     CN III, IV, VI   Pupils are equal, round, and reactive to light.    CN V   Facial sensation intact.     CN VII   Facial expression full, symmetric.     CN VIII   Hearing: intact    CN XI   CN XI normal.     CN XII   CN XII normal.     MOTOR EXAM        MS 4/5     SENSORY EXAM   Light touch normal.     GAIT AND COORDINATION     Tremor   Intention tremor: present (reports new since yestserday)       KENYON       Significant Labs:  Lab Results   Component Value Date    WBC 11.27 08/29/2020    HGB 15.0 08/29/2020    HCT 42.4 08/29/2020    MCV 84 08/29/2020     08/29/2020       CMP  Sodium   Date Value Ref Range Status   08/29/2020 136 136 - 145 mmol/L Final     Potassium   Date Value Ref Range Status   08/29/2020 3.5 3.5 - 5.1 mmol/L Final     Chloride   Date Value Ref Range Status   08/29/2020 97 95 - 110 mmol/L Final     CO2   Date Value Ref Range Status   08/29/2020 25  23 - 29 mmol/L Final     Glucose   Date Value Ref Range Status   08/29/2020 233 (H) 70 - 110 mg/dL Final     BUN, Bld   Date Value Ref Range Status   08/29/2020 18 8 - 23 mg/dL Final     Creatinine   Date Value Ref Range Status   08/29/2020 0.7 0.5 - 1.4 mg/dL Final     Calcium   Date Value Ref Range Status   08/29/2020 9.5 8.7 - 10.5 mg/dL Final     Total Protein   Date Value Ref Range Status   08/29/2020 7.7 6.0 - 8.4 g/dL Final     Albumin   Date Value Ref Range Status   08/29/2020 4.2 3.5 - 5.2 g/dL Final     Total Bilirubin   Date Value Ref Range Status   08/29/2020 1.2 (H) 0.1 - 1.0 mg/dL Final     Comment:     For infants and newborns, interpretation of results should be based  on gestational age, weight and in agreement with clinical  observations.  Premature Infant recommended reference ranges:  Up to 24 hours.............<8.0 mg/dL  Up to 48 hours............<12.0 mg/dL  3-5 days..................<15.0 mg/dL  6-29 days.................<15.0 mg/dL       Alkaline Phosphatase   Date Value Ref Range Status   08/29/2020 68 55 - 135 U/L Final     AST   Date Value Ref Range Status   08/29/2020 17 10 - 40 U/L Final     ALT   Date Value Ref Range Status   08/29/2020 16 10 - 44 U/L Final     Anion Gap   Date Value Ref Range Status   08/29/2020 14 8 - 16 mmol/L Final     eGFR if    Date Value Ref Range Status   08/29/2020 >60 >60 mL/min/1.73 m^2 Final     eGFR if non    Date Value Ref Range Status   08/29/2020 >60 >60 mL/min/1.73 m^2 Final     Comment:     Calculation used to obtain the estimated glomerular filtration  rate (eGFR) is the CKD-EPI equation.            Significant Imaging: Echo Color Flow Doppler? Yes  · The mean diastolic gradient across the mitral valve is 1 mmHg at a heart   rate of 98 bpm.  · Normal left ventricular systolic function. The estimated ejection   fraction is 63%.  · Normal LV diastolic function.  · Mild mitral regurgitation.  · Intermediate central venous  pressure (8 mmHg).  · The estimated PA systolic pressure is 41 mmHg.  · Pulmonary hypertension present.  · No PFO on saline bubble study     MRA Brain  Narrative: EXAMINATION:  MRA BRAIN WITHOUT CONTRAST    CLINICAL HISTORY:  Stroke, follow up;    TECHNIQUE:  3D Time-of-flight Images were performed over the brain.  MIP/MPR 3D  reformatted images were created.  Contrast was not administered    COMPARISON:  Head CT dated 08/28/2020    FINDINGS:  Anterior circulation: There is normal flow related signal intensity within the petrous and cavernous segments of the internal carotid arteries.  The supraclinoid internal carotid arteries are patent.  The carotid terminus is normal bilaterally.  There is normal branching into the anterior and middle cerebral arteries.  There is no critical stenosis, occlusion, thrombosis or dissection.  There is no large aneurysm or other vascular malformation.  There is very mild nonocclusive stenosis at the origin of the left A1 segment.    Posterior circulation: The right vertebral artery is dominant.  The very distal V4 segment of the left vertebral artery is identified and is small in caliber representing developmental hypoplasia.  There is mild irregularity of the mid basilar artery which is nonspecific and could be related to atherosclerotic disease.  The basilar tip is normal.  There is normal branching into the superior cerebellar arteries.  The left superior cerebellar artery is duplicated.  The origin of the right posterior cerebral artery is normal.  There is fetal origin of the left posterior cerebral artery with a prominent left-sided posterior communicating artery.  These vessels are all widely patent and represent normal developmental variation.  Impression: 1.  There is no acute or significant abnormality.  There is no critical stenosis, occlusion, thrombosis or dissection.  There is no large vessel occlusion.  There is no large aneurysm or other vascular malformation.  There  is mild nonocclusive stenosis at the proximal left A1 segment of anterior cerebral artery.  There is also mild irregularity of the mid basilar artery which is nonspecific, likely atherosclerotic in etiology.    2.  There is developmental variation with fetal origin of the left posterior cerebral artery.    Electronically signed by: Robbie Early MD  Date:    08/29/2020  Time:    10:36  MRI BRAIN WITHOUT CONTRAST  Narrative: EXAMINATION:  MRI BRAIN WITHOUT CONTRAST    CLINICAL HISTORY:  Stroke, follow up;.    TECHNIQUE:  Multiplanar multisequence MR imaging of the brain was performed without the administration of intravenous contrast.    COMPARISON:  CT head 08/28/2020    FINDINGS:  Intracranial compartment:    Mild generalized cerebral volume loss with compensatory enlargement of the ventricles.  No hydrocephalus.  No extra-axial blood or fluid collections.    Confluent and scattered areas of T2/FLAIR hyperintense signal involving the periventricular, deep cerebral and pontine white matter, which are nonspecific and may represent a severe degree of chronic microvascular ischemic change.  Chronic left corona radiata infarct.  Diffusion-weighted images demonstrate no evidence of an acute infarct.   Susceptibility weighted images demonstrate no evidence of acute or chronic hemorrhage.    Normal vascular flow voids are preserved.    Skull/extracranial contents (limited evaluation): Bone marrow signal intensity is normal. Paranasal sinuses and mastoid air cells are essentially clear. Right lens replacement noted.  Impression: 1. No acute intracranial abnormality.  2.  Generalized cerebral volume loss with confluent and scattered areas of supratentorial and pontine white matter T2/FLAIR intensity, which are nonspecific and probably represent a severe degree of chronic microvascular ischemic change.    Electronically signed by: Umair Duncan  Date:    08/29/2020  Time:    10:32        Assessment and Plan:    Acute  Encephalopathy Slurred speech: Resolved  Rule out CVA/Seizure  -MRI brain: negative  -MRA brain: negative  -CUS: negative  -ECHO: negative  -EEG: pending  B1-in process  B6-in process  B12- N(210-950)  Folate- N(4-24)  Ammonia- N(10-50)  TSH-N(0.400-4.0)  RPR -ordered    Neuro imaging negative for infarction. Encephalopathy work up in progress. Pt is not at baseline. Rec keeping her over night for observation.        [phone] days from discharge.     Stroke education was provided including stroke risk factors modification and any acute neurological changes including weakness, confusion, visual changes to come straight to the ER.     All questions were answered.                                    Active Diagnoses:    Diagnosis Date Noted POA    PRINCIPAL PROBLEM:  CVA (cerebral vascular accident) [I63.9] 08/28/2020 Yes    UTI (urinary tract infection) [N39.0] 08/29/2020 Yes    Hyperlipidemia associated with type 2 diabetes mellitus [E11.69, E78.5] 08/29/2020 Yes    Type 2 diabetes mellitus [E11.9] 08/29/2020 Yes    Altered mental status [R41.82] 08/29/2020 Yes    Hypertension associated with diabetes [E11.59, I10] 11/13/2019 Yes    Paroxysmal A-fib [I48.0] 11/13/2019 Yes      Problems Resolved During this Admission:       VTE Risk Mitigation (From admission, onward)         Ordered     IP VTE HIGH RISK PATIENT  Once      08/29/20 0006     Place sequential compression device  Until discontinued      08/29/20 0006     Reason for No Pharmacological VTE Prophylaxis  Once     Question:  Reasons:  Answer:  Physician Provided (leave comment)    08/29/20 0006                Thank you for your consult. I will follow-up with patient. Please contact us if you have any additional questions.    Ashley Shay NP  Neurology  [facility]

## 2020-08-30 NOTE — PROGRESS NOTES
[facility]  Neurology  Progress Note    Patient Name: Maria T Lund  MRN: [id number]  Admission Date: 8/28/2020  Hospital Length of Stay: 1 days  Code Status: Full Code   Attending Provider: Dr Wyman  Consulting Provider: Dr Victor  Primary Care Physician: Mikey Smith MD  Principal Problem:CVA (cerebral vascular accident)    Consults  Subjective:     Chief Complaint:   Aphasia         HPI: Maria T Lund is a 74 y/o female with a past medical history significant for A-fib, Anticoagulant long-term use, Arthritis, Breast cancer, Diabetes mellitus, Gastric reflux, Glaucoma, Hyperlipemia, and Hypertension presented with altered mental status and difficulty speaking. Per 's note, on presentation to ED at [facility], she was found confused with aphasia. No family available to give details and it is unclear what patients baseline is. Work up in ED was unrevealing including CBC, BMP, UA, urine tox screen. CT head is negative for any acute pathology. Patient does not appear to have any focal deficits. Pt is transferred to [facility] for further neurology evaluation.  Upon arrival, pt is awake and alert, in no acute distress. She's admitted to the service of hospital medicine for further treatment.    Neurological Consult:  Patient seen and examined.  Plan of care discussed with Dr. Victor.  The patient had altered mental status and confusion yesterday.   at bedside to give history.  He said that they started out with the patient having a headache and she was tired.  Then she became more confused throughout the day.  Patient and  deny that this has ever happened.  Patient denies history of seizures.  She reports she does take Xarelto as scheduled.  She says she does not take aspirin.  Patient has bilateral intentional tremors which patient reports is new.  She is almost at baseline per her .  Will need EEG carotid ultrasound added to workup.  MRI  negative.    8/30: Pt seen and examined with Dr. Victor. PT is alert and oriented. She follows commands, moves all extremities. No tremors noted. PT and  report she is at baseline.    Past Medical History:   Diagnosis Date    A-fib     Anticoagulant long-term use     Arthritis     Breast cancer 11/2018    right breast    Diabetes mellitus     Gastric reflux     Glaucoma     Hyperlipemia     Hypertension        Past Surgical History:   Procedure Laterality Date    APPENDECTOMY      AV NODE ABLATION      x 2    CATARACT EXTRACTION Right     CHOLECYSTECTOMY      FOOT SURGERY Right     Achilles tendon replacement    HYSTERECTOMY      SENTINEL LYMPH NODE BIOPSY Right 3/14/2019    Procedure: BIOPSY, LYMPH NODE, SENTINEL;  Surgeon: Lisa Youssef MD;  Location: [facility];  Service: General;  Laterality: Right;    TONSILLECTOMY      WISDOM TOOTH EXTRACTION         Review of patient's allergies indicates:   Allergen Reactions    Adhesive Rash       Current Neurological Medications:  Xarelto    No current facility-administered medications on file prior to encounter.      Current Outpatient Medications on File Prior to Encounter   Medication Sig    aspirin (ECOTRIN) 81 MG EC tablet Take 1 tablet (81 mg total) by mouth once daily. HOLDING FOR 4 DAYS PRIOR TO SURGERY    b complex vitamins tablet Take 1 tablet by mouth once daily.    cholecalciferol, vitamin D3, (VITAMIN D3) 1,000 unit capsule 1 capsule    escitalopram oxalate (LEXAPRO) 10 MG tablet Take 1 tablet (10 mg total) by mouth once daily.    ferrous sulfate (IRON) 325 mg (65 mg iron) Tab tablet Take 325 mg by mouth daily with breakfast.    Lactobac no.41/Bifidobact no.7 (PROBIOTIC-10 ORAL) Take by mouth.    LANTUS SOLOSTAR U-100 INSULIN glargine 100 units/mL (3mL) SubQ pen Inject 51 Units into the skin once daily.    latanoprost 0.005 % ophthalmic solution Place 1 drop into both eyes every evening. Take night before surgery     "letrozole (FEMARA) 2.5 mg Tab Take 2.5 mg by mouth once daily .    losartan (COZAAR) 50 MG tablet Take 1 tablet (50 mg total) by mouth once daily. Hold morning of surgery    magnesium-calcium-folic acid 400-200-1 mg Tab Take by mouth.    metoprolol succinate (TOPROL-XL) 100 MG 24 hr tablet Take 1 tablet by mouth once daily.    PEN NEEDLE 31 gauge x 5/16" Ndle     RABEprazole (ACIPHEX) 20 mg tablet Take 1 tablet (20 mg total) by mouth once daily. Morning of surgery    rivaroxaban (XARELTO) 20 mg Tab Take 20 mg by mouth daily with dinner or evening meal. Hold per MD instructions     rosuvastatin (CRESTOR) 20 MG tablet Take 1 tablet by mouth once daily.    timolol (BETIMOL) 0.5 % ophthalmic solution Place 1 drop into both eyes once daily. Take morning of surgery      Family History     Problem Relation (Age of Onset)    Breast cancer Sister, Daughter (47)    Heart disease Mother        Tobacco Use    Smoking status: Never Smoker    Smokeless tobacco: Never Used   Substance and Sexual Activity    Alcohol use: No     Frequency: Never    Drug use: No    Sexual activity: Not on file     Review of Systems   HENT: Negative.    Eyes: Negative.  Negative for visual disturbance.   Respiratory: Negative.    Cardiovascular: Negative.    Gastrointestinal: Negative.    Endocrine: Negative.    Genitourinary: Negative.    Musculoskeletal: Negative.  Negative for back pain and neck pain.   Skin: Negative.    Allergic/Immunologic: Negative.    Hematological: Negative.      Objective:     Vital Signs (Most Recent):  Temp: 98.4 °F (36.9 °C) (08/30/20 0755)  Pulse: (!) 112 (08/30/20 0755)  Resp: 17 (08/30/20 0755)  BP: 127/67 (08/30/20 0755)  SpO2: (!) 92 % (08/30/20 0755) Vital Signs (24h Range):  Temp:  [96.7 °F (35.9 °C)-98.4 °F (36.9 °C)] 98.4 °F (36.9 °C)  Pulse:  [] 112  Resp:  [16-18] 17  SpO2:  [92 %-95 %] 92 %  BP: (127-192)/(67-89) 127/67     Weight: 83 kg (183 lb)  Body mass index is 30.45 kg/m².    Physical " Exam  HENT:      Head: Normocephalic.      Nose: Nose normal.      Mouth/Throat:      Mouth: Mucous membranes are moist.   Eyes:      Extraocular Movements: Extraocular movements intact.      Pupils: Pupils are equal, round, and reactive to light.   Neck:      Musculoskeletal: Normal range of motion.   Cardiovascular:      Rate and Rhythm: Normal rate.      Pulses: Normal pulses.   Pulmonary:      Effort: Pulmonary effort is normal.   Musculoskeletal: Normal range of motion.   Skin:     General: Skin is warm and dry.   Neurological:      Mental Status: She is alert and oriented to person, place, and time.      GCS: GCS eye subscore is 4. GCS verbal subscore is 5. GCS motor subscore is 6.      Motor: Weakness present.   Psychiatric:         Mood and Affect: Mood normal.         Behavior: Behavior normal.         NEUROLOGICAL EXAMINATION:     MENTAL STATUS   Oriented to person, place, and time.   Follows 1 step commands.   Speech: (Slightly slurred)  Level of consciousness: alert  Able to name object. Able to repeat.     CRANIAL NERVES     CN II   Visual fields full to confrontation.     CN III, IV, VI   Pupils are equal, round, and reactive to light.    CN V   Facial sensation intact.     CN VII   Facial expression full, symmetric.     CN VIII   Hearing: intact    CN XI   CN XI normal.     CN XII   CN XII normal.     MOTOR EXAM        MS 4/5     SENSORY EXAM   Light touch normal.     GAIT AND COORDINATION     Tremor   Intention tremor: absent       KENYON       Significant Labs:  Lab Results   Component Value Date    WBC 9.76 08/30/2020    HGB 15.5 08/30/2020    HCT 45.6 08/30/2020    MCV 84 08/30/2020     08/30/2020       CMP  Sodium   Date Value Ref Range Status   08/30/2020 142 136 - 145 mmol/L Final     Potassium   Date Value Ref Range Status   08/30/2020 3.5 3.5 - 5.1 mmol/L Final     Chloride   Date Value Ref Range Status   08/30/2020 100 95 - 110 mmol/L Final     CO2   Date Value Ref Range Status    08/30/2020 28 23 - 29 mmol/L Final     Glucose   Date Value Ref Range Status   08/30/2020 138 (H) 70 - 110 mg/dL Final     BUN, Bld   Date Value Ref Range Status   08/30/2020 18 8 - 23 mg/dL Final     Creatinine   Date Value Ref Range Status   08/30/2020 0.8 0.5 - 1.4 mg/dL Final     Calcium   Date Value Ref Range Status   08/30/2020 10.0 8.7 - 10.5 mg/dL Final     Total Protein   Date Value Ref Range Status   08/30/2020 7.9 6.0 - 8.4 g/dL Final     Albumin   Date Value Ref Range Status   08/30/2020 4.3 3.5 - 5.2 g/dL Final     Total Bilirubin   Date Value Ref Range Status   08/30/2020 1.5 (H) 0.1 - 1.0 mg/dL Final     Comment:     For infants and newborns, interpretation of results should be based  on gestational age, weight and in agreement with clinical  observations.  Premature Infant recommended reference ranges:  Up to 24 hours.............<8.0 mg/dL  Up to 48 hours............<12.0 mg/dL  3-5 days..................<15.0 mg/dL  6-29 days.................<15.0 mg/dL       Alkaline Phosphatase   Date Value Ref Range Status   08/30/2020 67 55 - 135 U/L Final     AST   Date Value Ref Range Status   08/30/2020 18 10 - 40 U/L Final     ALT   Date Value Ref Range Status   08/30/2020 15 10 - 44 U/L Final     Anion Gap   Date Value Ref Range Status   08/30/2020 14 8 - 16 mmol/L Final     eGFR if    Date Value Ref Range Status   08/30/2020 >60 >60 mL/min/1.73 m^2 Final     eGFR if non    Date Value Ref Range Status   08/30/2020 >60 >60 mL/min/1.73 m^2 Final     Comment:     Calculation used to obtain the estimated glomerular filtration  rate (eGFR) is the CKD-EPI equation.            Significant Imaging: US Carotid Bilateral  Narrative: EXAMINATION:  US CAROTID BILATERAL    CLINICAL HISTORY:  tia;    TECHNIQUE:  Grayscale and color Doppler ultrasound examination of the carotid and vertebral artery systems bilaterally.  Stenosis estimates are per the NASCET measurement  criteria.    COMPARISON:  None.    FINDINGS:  Right:    Internal Carotid Artery (ICA) peak systolic velocity 51 cm/sec    ICA/CCA peak systolic velocity ratio: 0.8    Plaque formation: Mildly heterogeneous atherosclerotic plaque.    Vertebral artery: Antegrade flow and normal waveform.    Left:    Internal Carotid Artery (ICA)  peak systolic velocity 64 cm/sec    ICA/CCA peak systolic velocity ratio: 1.1    Plaque formation: Calcified    Vertebral artery: Antegrade flow and normal waveform.  Impression: No evidence of a hemodynamically significant carotid bifurcation stenosis.    Electronically signed by: Tommy George  Date:    08/29/2020  Time:    23:43  Echo Color Flow Doppler? Yes  · The mean diastolic gradient across the mitral valve is 1 mmHg at a heart   rate of 98 bpm.  · Normal left ventricular systolic function. The estimated ejection   fraction is 63%.  · Normal LV diastolic function.  · Mild mitral regurgitation.  · Intermediate central venous pressure (8 mmHg).  · The estimated PA systolic pressure is 41 mmHg.  · Pulmonary hypertension present.  · No PFO on saline bubble study     MRA Brain  Narrative: EXAMINATION:  MRA BRAIN WITHOUT CONTRAST    CLINICAL HISTORY:  Stroke, follow up;    TECHNIQUE:  3D Time-of-flight Images were performed over the brain.  MIP/MPR 3D  reformatted images were created.  Contrast was not administered    COMPARISON:  Head CT dated 08/28/2020    FINDINGS:  Anterior circulation: There is normal flow related signal intensity within the petrous and cavernous segments of the internal carotid arteries.  The supraclinoid internal carotid arteries are patent.  The carotid terminus is normal bilaterally.  There is normal branching into the anterior and middle cerebral arteries.  There is no critical stenosis, occlusion, thrombosis or dissection.  There is no large aneurysm or other vascular malformation.  There is very mild nonocclusive stenosis at the origin of the left A1  segment.    Posterior circulation: The right vertebral artery is dominant.  The very distal V4 segment of the left vertebral artery is identified and is small in caliber representing developmental hypoplasia.  There is mild irregularity of the mid basilar artery which is nonspecific and could be related to atherosclerotic disease.  The basilar tip is normal.  There is normal branching into the superior cerebellar arteries.  The left superior cerebellar artery is duplicated.  The origin of the right posterior cerebral artery is normal.  There is fetal origin of the left posterior cerebral artery with a prominent left-sided posterior communicating artery.  These vessels are all widely patent and represent normal developmental variation.  Impression: 1.  There is no acute or significant abnormality.  There is no critical stenosis, occlusion, thrombosis or dissection.  There is no large vessel occlusion.  There is no large aneurysm or other vascular malformation.  There is mild nonocclusive stenosis at the proximal left A1 segment of anterior cerebral artery.  There is also mild irregularity of the mid basilar artery which is nonspecific, likely atherosclerotic in etiology.    2.  There is developmental variation with fetal origin of the left posterior cerebral artery.    Electronically signed by: Robbie Early MD  Date:    08/29/2020  Time:    10:36  MRI BRAIN WITHOUT CONTRAST  Narrative: EXAMINATION:  MRI BRAIN WITHOUT CONTRAST    CLINICAL HISTORY:  Stroke, follow up;.    TECHNIQUE:  Multiplanar multisequence MR imaging of the brain was performed without the administration of intravenous contrast.    COMPARISON:  CT head 08/28/2020    FINDINGS:  Intracranial compartment:    Mild generalized cerebral volume loss with compensatory enlargement of the ventricles.  No hydrocephalus.  No extra-axial blood or fluid collections.    Confluent and scattered areas of T2/FLAIR hyperintense signal involving the  periventricular, deep cerebral and pontine white matter, which are nonspecific and may represent a severe degree of chronic microvascular ischemic change.  Chronic left corona radiata infarct.  Diffusion-weighted images demonstrate no evidence of an acute infarct.   Susceptibility weighted images demonstrate no evidence of acute or chronic hemorrhage.    Normal vascular flow voids are preserved.    Skull/extracranial contents (limited evaluation): Bone marrow signal intensity is normal. Paranasal sinuses and mastoid air cells are essentially clear. Right lens replacement noted.  Impression: 1. No acute intracranial abnormality.  2.  Generalized cerebral volume loss with confluent and scattered areas of supratentorial and pontine white matter T2/FLAIR intensity, which are nonspecific and probably represent a severe degree of chronic microvascular ischemic change.    Electronically signed by: Umair Duncan  Date:    08/29/2020  Time:    10:32        Assessment and Plan:    Acute Encephalopathy Slurred speech: Resolved  Rule out CVA/Seizure  -MRI brain: negative  -MRA brain: negative  -CUS: negative  -ECHO: negative  -EEG: pending  B1-in process  B6-in process  B12- N(210-950) 768  Folate- N(4-24) 30.3  Ammonia- N(10-50) 10  TSH-N(0.400-4.0) ordered  RPR -ordered    Pt at baseline. Neuro work up negative for CVA/Seizure. If patient has more AMS episodes we can complete an ambulatory LTM. Rec better glucose control.        Patient to [facility] at [phone] within 3 days from discharge.     Stroke education was provided including stroke risk factors modification and any acute neurological changes including weakness, confusion, visual changes to come straight to the ER.     All questions were answered.                                    Active Diagnoses:    Diagnosis Date Noted POA    PRINCIPAL PROBLEM:  CVA (cerebral vascular accident) [I63.9] 08/28/2020 Yes    UTI (urinary tract infection)  [N39.0] 08/29/2020 Yes    Hyperlipidemia associated with type 2 diabetes mellitus [E11.69, E78.5] 08/29/2020 Yes    Type 2 diabetes mellitus [E11.9] 08/29/2020 Yes    Altered mental status [R41.82] 08/29/2020 Yes    Hypertension associated with diabetes [E11.59, I10] 11/13/2019 Yes    Paroxysmal A-fib [I48.0] 11/13/2019 Yes      Problems Resolved During this Admission:       VTE Risk Mitigation (From admission, onward)         Ordered     IP VTE HIGH RISK PATIENT  Once      08/29/20 0006     Place sequential compression device  Until discontinued      08/29/20 0006     Reason for No Pharmacological VTE Prophylaxis  Once     Question:  Reasons:  Answer:  Physician Provided (leave comment)    08/29/20 0006                Thank you for your consult. I will follow-up with patient. Please contact us if you have any additional questions.    Ashley Shay NP  Neurology  [facility]

## 2020-08-31 LAB — RPR SER QL: NORMAL

## 2020-08-31 NOTE — HOSPITAL COURSE
Patient monitored closely during hospitalization. She was placed on telemetry with no arrhythmias noted.Neurology consulted.  She underwent complete neurological workup which is negative for acute process. See results below. She received IV abx for possible UTI. PT/OT/ST consulted. She is doing well and is stable for DC from neurological standpoint.     PE  Chest cTA, heart RRR neuro AAOx3 at baseline.

## 2020-09-01 NOTE — DISCHARGE SUMMARY
[facility] Hospital Medicine  Discharge Summary      Patient Name: Maria T Lund  MRN: [id number]  Admission Date: 8/28/2020  Hospital Length of Stay: 1 days  Discharge Date and Time: 8/30/2020 10:20 AM  Attending Physician: No att. providers found   Discharging Provider: Melissa Pringle NP  Primary Care Provider: Mikey Smith MD      HPI:   Maria T Lund is a 76 y/o female with a past medical history significant for A-fib, Anticoagulant long-term use, Arthritis, Breast cancer, Diabetes mellitus, Gastric reflux, Glaucoma, Hyperlipemia, and Hypertension presented with altered mental status and difficulty speaking. Per 's note, on presentation to ED at [facility] [place], she was found confused with aphasia. No family available to give details and it is unclear what patients baseline is. Work up in ED was unrevealing including CBC, BMP, UA, urine tox screen. CT head is negative for any acute pathology. Patient does not appear to have any focal deficits. Pt is transferred to [facility] for further neurology evaluation.  Upon arrival, pt is awake and alert, in no acute distress. She's admitted to the service of hospital medicine for further treatment.      * No surgery found *      Hospital Course:   Patient monitored closely during hospitalization. She was placed on telemetry with no arrhythmias noted.Neurology consulted.  She underwent complete neurological workup which is negative for acute process. See results below. She received IV abx for possible UTI. PT/OT/ST consulted. She is doing well and is stable for DC from neurological standpoint.     PE  Chest cTA, heart RRR neuro AAOx3 at baseline.      Consults:   Consults (From admission, onward)        Status Ordering Provider     Inpatient consult to Registered Dietitian/Nutritionist  Once     Provider:  (Not yet assigned)    MATEO Shrestha consult to case management/social work  Once     Provider:   (Not yet assigned)    Completed MATEO SANTORO          No new Assessment & Plan notes have been filed under this hospital service since the last note was generated.  Service: Hospital Medicine    Final Active Diagnoses:    Diagnosis Date Noted POA    PRINCIPAL PROBLEM:  CVA (cerebral vascular accident) [I63.9] 08/28/2020 Yes    UTI (urinary tract infection) [N39.0] 08/29/2020 Yes    Hyperlipidemia associated with type 2 diabetes mellitus [E11.69, E78.5] 08/29/2020 Yes    Type 2 diabetes mellitus [E11.9] 08/29/2020 Yes    Altered mental status [R41.82] 08/29/2020 Yes    Hypertension associated with diabetes [E11.59, I10] 11/13/2019 Yes    Paroxysmal A-fib [I48.0] 11/13/2019 Yes      Problems Resolved During this Admission:       Discharged Condition: stable    [place] [facility] [place]:  Follow-up Information     Mikey Smith MD In 1 week.    Specialty: Family Medicine  Contact information:  [address]  [address]  [place] [address]  [phone]             Candido Victor MD In 1 week.    Specialty: Neurology  Contact information:  [address] [address]  [phone]             [facility].    Specialty: Home Health Services  Contact information:  [address] [address] [address] [phone]                 Patient Instructions:      Ambulatory referral/consult to Home Health   Standing Status: Future   Referral Priority: Routine Referral Type: Home Health   Referral Reason: Specialty Services Required   Requested Specialty: Home Health Services   Number of Visits Requested: 1     Diet diabetic     Diet Cardiac   Order Comments: See Stroke Patient Education Guide Booklet for details.     Call 911 for any of the following:   Order Comments: Call 911  right away if any of the following warning signs come on suddenly, even if the symptoms only last for a few minutes. With stroke, timing is  very important.   - Warning Signs of Stroke:  - Weakness: You may feel a sudden weakness, tingling or loss of feeling on one side of your face or body.  - Vision Problems: You may have sudden double vision or trouble seeing in one or both eyes.  - Speech Problems: You may have sudden trouble talking, slured speech, or problems understanding others.  - Headache: You may have sudden, severe headache.  - Movement Problems: You may experience dizziness, a feeling of spinning, a loss of balance, a feeling of falling or blackouts.     Activity as tolerated     Call MD for:  persistent dizziness, light-headedness, or visual disturbances     Call MD for:  severe uncontrolled pain     Call MD for:  persistent nausea and vomiting or diarrhea     Call MD for:  temperature >100.4     Call MD for:  redness, tenderness, or signs of infection (pain, swelling, redness, odor or green/yellow discharge around incision site)       Significant Diagnostic Studies: Labs:   BMP: No results for input(s): GLU, NA, K, CL, CO2, BUN, CREATININE, CALCIUM, MG in the last 48 hours., CMP No results for input(s): NA, K, CL, CO2, GLU, BUN, CREATININE, CALCIUM, PROT, ALBUMIN, BILITOT, ALKPHOS, AST, ALT, ANIONGAP, ESTGFRAFRICA, EGFRNONAA in the last 48 hours., Lipid Panel   Lab Results   Component Value Date    CHOL 147 08/29/2020    HDL 52 08/29/2020    LDLCALC 84.4 08/29/2020    TRIG 53 08/29/2020    CHOLHDL 35.4 08/29/2020    and Troponin   Recent Labs   Lab 08/28/20  1615 08/29/20  0627   TROPONINI 0.01* 0.021     Radiology:   US Carotid Bilateral [[id number]] Resulted: 08/29/20 2343   Order Status: Completed Updated: 08/29/20 2345   Narrative:     EXAMINATION:   US CAROTID BILATERAL     CLINICAL HISTORY:   tia;     TECHNIQUE:   Grayscale and color Doppler ultrasound examination of the carotid and vertebral artery systems bilaterally.  Stenosis estimates are per the NASCET measurement criteria.     COMPARISON:   None.     FINDINGS:   Right:      Internal Carotid Artery (ICA) peak systolic velocity 51 cm/sec     ICA/CCA peak systolic velocity ratio: 0.8     Plaque formation: Mildly heterogeneous atherosclerotic plaque.     Vertebral artery: Antegrade flow and normal waveform.     Left:     Internal Carotid Artery (ICA)  peak systolic velocity 64 cm/sec     ICA/CCA peak systolic velocity ratio: 1.1     Plaque formation: Calcified     Vertebral artery: Antegrade flow and normal waveform.    Impression:       No evidence of a hemodynamically significant carotid bifurcation stenosis.       Electronically signed by: Tommy George   Date: 08/29/2020   Time: 23:43   MRA Brain [[id number]] Resulted: 08/29/20 1036   Order Status: Completed Updated: 08/29/20 1039   Narrative:     EXAMINATION:   MRA BRAIN WITHOUT CONTRAST     CLINICAL HISTORY:   Stroke, follow up;     TECHNIQUE:   3D Time-of-flight Images were performed over the brain.  MIP/MPR 3D  reformatted images were created.  Contrast was not administered     COMPARISON:   Head CT dated 08/28/2020     FINDINGS:   Anterior circulation: There is normal flow related signal intensity within the petrous and cavernous segments of the internal carotid arteries.  The supraclinoid internal carotid arteries are patent.  The carotid terminus is normal bilaterally.  There is normal branching into the anterior and middle cerebral arteries.  There is no critical stenosis, occlusion, thrombosis or dissection.  There is no large aneurysm or other vascular malformation.  There is very mild nonocclusive stenosis at the origin of the left A1 segment.     Posterior circulation: The right vertebral artery is dominant.  The very distal V4 segment of the left vertebral artery is identified and is small in caliber representing developmental hypoplasia.  There is mild irregularity of the mid basilar artery which is nonspecific and could be related to atherosclerotic disease.  The basilar tip is normal.  There is normal branching into  the superior cerebellar arteries.  The left superior cerebellar artery is duplicated.  The origin of the right posterior cerebral artery is normal.  There is fetal origin of the left posterior cerebral artery with a prominent left-sided posterior communicating artery.  These vessels are all widely patent and represent normal developmental variation.    Impression:       1.  There is no acute or significant abnormality.  There is no critical stenosis, occlusion, thrombosis or dissection.  There is no large vessel occlusion.  There is no large aneurysm or other vascular malformation.  There is mild nonocclusive stenosis at the proximal left A1 segment of anterior cerebral artery.  There is also mild irregularity of the mid basilar artery which is nonspecific, likely atherosclerotic in etiology.     2.  There is developmental variation with fetal origin of the left posterior cerebral artery.       Electronically signed by: Robbie Early MD   Date: 08/29/2020   Time: 10:36   MRI BRAIN WITHOUT CONTRAST [[id number]] Resulted: 08/29/20 1032   Order Status: Completed Updated: 08/29/20 1035   Narrative:     EXAMINATION:   MRI BRAIN WITHOUT CONTRAST     CLINICAL HISTORY:   Stroke, follow up;.     TECHNIQUE:   Multiplanar multisequence MR imaging of the brain was performed without the administration of intravenous contrast.     COMPARISON:   CT head 08/28/2020     FINDINGS:   Intracranial compartment:     Mild generalized cerebral volume loss with compensatory enlargement of the ventricles.  No hydrocephalus.  No extra-axial blood or fluid collections.     Confluent and scattered areas of T2/FLAIR hyperintense signal involving the periventricular, deep cerebral and pontine white matter, which are nonspecific and may represent a severe degree of chronic microvascular ischemic change.  Chronic left corona radiata infarct.  Diffusion-weighted images demonstrate no evidence of an acute infarct.   Susceptibility weighted images  "demonstrate no evidence of acute or chronic hemorrhage.     Normal vascular flow voids are preserved.     Skull/extracranial contents (limited evaluation): Bone marrow signal intensity is normal. Paranasal sinuses and mastoid air cells are essentially clear. Right lens replacement noted.    Impression:       1. No acute intracranial abnormality.   2.  Generalized cerebral volume loss with confluent and scattered areas of supratentorial and pontine white matter T2/FLAIR intensity, which are nonspecific and probably represent a severe degree of chronic microvascular ischemic change.       Electronically signed by: Umair Duncan        Cardiac Graphics: Echocardiogram:   Transthoracic echo (TTE) complete (Cupid Only):   Results for orders placed or performed during the hospital encounter of 08/28/20   Echo Color Flow Doppler? Yes   Result Value Ref Range    BSA 1.95 m2    TDI SEPTAL 0.07 m/s    TDI LATERAL 0.13 m/s    MV mean gradient 1 mmHg    Mean e' 0.10 m/s    MV "A" wave duration 72.00 msec    Pulm vein "A" wave 84.00 msec    LV LATERAL E/E' RATIO 7.08 m/s    LV SEPTAL E/E' RATIO 13.14 m/s    LA WIDTH 3.50 cm    AORTIC VALVE CUSP SEPERATION 1.59 cm    PV PEAK VELOCITY 0.69 cm/s    LVIDD 4.84 3.5 - 6.0 cm    IVS 0.77 0.6 - 1.1 cm    PW 0.77 0.6 - 1.1 cm    Ao root annulus 2.82 cm    LVIDS 3.19 2.1 - 4.0 cm    FS 34 28 - 44 %    LA volume 48.11 cm3    Sinus 2.94 cm    STJ 2.68 cm    Ascending aorta 3.16 cm    LV mass 122.33 g    LA size 3.43 cm    RVDD 3.23 cm    TAPSE 2.69 cm    Left Ventricle Relative Wall Thickness 0.32 cm    AV mean gradient 2 mmHg    AV valve area 3.27 cm2    AV Velocity Ratio 0.70     AV index (prosthetic) 0.97     MV valve area p 1/2 method 4.93 cm2    E/A ratio 2.63     E wave decelartion time 153.88 msec    IVRT 68.51 msec    LVOT diameter 2.07 cm    LVOT area 3.4 cm2    LVOT peak [place] 0.75 m/s    LVOT peak VTI 15.07 cm    Ao peak [place] 1.07 m/s    Ao VTI 15.51 cm    Mr max [place] 0.04 m/s    LVOT " stroke volume 50.69 cm3    AV peak gradient 5 mmHg    E/E' ratio 9.20 m/s    MV Peak E Bernard 0.92 m/s    TR Max Bernard 2.89 m/s    MV stenosis pressure 1/2 time 44.63 ms    MV Peak A Bernard 0.35 m/s    LV Systolic Volume 40.69 mL    LV Systolic Volume Index 21.4 mL/m2    LV Diastolic Volume 109.55 mL    LV Diastolic Volume Index 57.51 mL/m2    LA Volume Index 25.3 mL/m2    LV Mass Index 64 g/m2    RA Major Axis 4.75 cm    Left Atrium Minor Axis 4.73 cm    Left Atrium Major Axis 4.70 cm    Triscuspid Valve Regurgitation Peak Gradient 33 mmHg    RA Width 2.90 cm    Right Atrial Pressure (from IVC) 8 mmHg    TV rest pulmonary artery pressure 41 mmHg    Narrative    · The mean diastolic gradient across the mitral valve is 1 mmHg at a heart   rate of 98 bpm.  · Normal left ventricular systolic function. The estimated ejection   fraction is 63%.  · Normal LV diastolic function.  · Mild mitral regurgitation.  · Intermediate central venous pressure (8 mmHg).  · The estimated PA systolic pressure is 41 mmHg.  · Pulmonary hypertension present.  · No PFO on saline bubble study          Pending Diagnostic Studies:     Procedure Component Value Units Date/Time    Vitamin B1 [[id number]] Collected: 08/30/20 0541    Order Status: Sent Lab Status: In process Updated: 08/30/20 0556    Specimen: Blood     Vitamin B6 [[id number]] Collected: 08/30/20 0541    Order Status: Sent Lab Status: In process Updated: 08/30/20 0556    Specimen: Blood          Medications:  Reconciled Home Medications:      Medication List      START taking these medications    ciprofloxacin HCl 250 MG tablet  Commonly known as: CIPRO  Take 1 tablet (250 mg total) by mouth every 12 (twelve) hours. for 5 days     ondansetron 8 MG Tbdl  Commonly known as: ZOFRAN-ODT  Take 1 tablet (8 mg total) by mouth every 8 (eight) hours as needed.        CONTINUE taking these medications    aspirin 81 MG EC tablet  Commonly known as: ECOTRIN  Take 1 tablet (81 mg total) by mouth once  "daily. HOLDING FOR 4 DAYS PRIOR TO SURGERY     b complex vitamins tablet  Take 1 tablet by mouth once daily.     cholecalciferol (vitamin D3) 25 mcg (1,000 unit) capsule  Commonly known as: VITAMIN D3  1 capsule     escitalopram oxalate 10 MG tablet  Commonly known as: LEXAPRO  Take 1 tablet (10 mg total) by mouth once daily.     IRON 325 mg (65 mg iron) Tab tablet  Generic drug: ferrous sulfate  Take 325 mg by mouth daily with breakfast.     LANTUS SOLOSTAR U-100 INSULIN glargine 100 units/mL (3mL) SubQ pen  Generic drug: insulin  Inject 51 Units into the skin once daily.     latanoprost 0.005 % ophthalmic solution  Place 1 drop into both eyes every evening. Take night before surgery     letrozole 2.5 mg Tab  Commonly known as: FEMARA  Take 2.5 mg by mouth once daily .     losartan 50 MG tablet  Commonly known as: COZAAR  Take 1 tablet (50 mg total) by mouth once daily. Hold morning of surgery     magnesium-calcium-folic acid 400-200-1 mg Tab  Take by mouth.     metoprolol succinate 100 MG 24 hr tablet  Commonly known as: TOPROL-XL  Take 1 tablet by mouth once daily.     PEN NEEDLE 31 gauge x 5/16" Ndle  Generic drug: pen needle, diabetic     PROBIOTIC-10 ORAL  Take by mouth.     RABEprazole 20 mg tablet  Commonly known as: ACIPHEX  Take 1 tablet (20 mg total) by mouth once daily. Morning of surgery     rosuvastatin 20 MG tablet  Commonly known as: CRESTOR  Take 1 tablet by mouth once daily.     timoloL 0.5 % ophthalmic solution  Commonly known as: BETIMOL  Place 1 drop into both eyes once daily. Take morning of surgery     XARELTO 20 mg Tab  Generic drug: rivaroxaban  Take 20 mg by mouth daily with dinner or evening meal. Hold per MD instructions            Indwelling Lines/Drains at time of discharge:   Lines/Drains/Airways     None                 Time spent on the discharge of patient: 60 minutes  Patient was seen and examined on the date of discharge and determined to be suitable for discharge.     "     Melissa Pringle NP  Department of Hospital Medicine  [facility]

## 2020-09-02 ENCOUNTER — TELEPHONE (OUTPATIENT)
Dept: MEDSURG UNIT | Facility: HOSPITAL | Age: 75
End: 2020-09-02

## 2020-09-03 ENCOUNTER — TELEPHONE (OUTPATIENT)
Dept: FAMILY MEDICINE | Facility: CLINIC | Age: 75
End: 2020-09-03

## 2020-09-03 LAB
PYRIDOXAL SERPL-MCNC: 16 UG/L (ref 5–50)
VIT B1 BLD-MCNC: 66 UG/L (ref 38–122)

## 2020-09-03 NOTE — TELEPHONE ENCOUNTER
Pt needs a HFU appt with EL next week.   
Spoke to pt. Says she will call back to schedule an appt. Not able to do so at the moment.  
Speaking Coherently

## 2020-09-04 ENCOUNTER — EXTERNAL HOME HEALTH (OUTPATIENT)
Dept: HOME HEALTH SERVICES | Facility: HOSPITAL | Age: 75
End: 2020-09-04
Payer: MEDICARE

## 2020-09-10 ENCOUNTER — DOCUMENT SCAN (OUTPATIENT)
Dept: HOME HEALTH SERVICES | Facility: HOSPITAL | Age: 75
End: 2020-09-10
Payer: MEDICARE

## 2020-09-10 LAB — POCT GLUCOSE: 272 MG/DL (ref 70–110)

## 2020-09-22 ENCOUNTER — DOCUMENT SCAN (OUTPATIENT)
Dept: HOME HEALTH SERVICES | Facility: HOSPITAL | Age: 75
End: 2020-09-22
Payer: MEDICARE

## 2020-09-25 ENCOUNTER — DOCUMENT SCAN (OUTPATIENT)
Dept: HOME HEALTH SERVICES | Facility: HOSPITAL | Age: 75
End: 2020-09-25
Payer: MEDICARE

## 2020-10-02 ENCOUNTER — DOCUMENT SCAN (OUTPATIENT)
Dept: HOME HEALTH SERVICES | Facility: HOSPITAL | Age: 75
End: 2020-10-02

## 2021-01-25 ENCOUNTER — LAB VISIT (OUTPATIENT)
Dept: LAB | Facility: HOSPITAL | Age: 76
End: 2021-01-25
Attending: DERMATOLOGY
Payer: MEDICARE

## 2021-01-25 DIAGNOSIS — Z92.22 PERSONAL HISTORY OF MONOCLONAL DRUG THERAPY: ICD-10-CM

## 2021-01-25 DIAGNOSIS — L40.0 PSORIASIS VULGARIS: Primary | ICD-10-CM

## 2021-01-25 PROCEDURE — 36415 COLL VENOUS BLD VENIPUNCTURE: CPT

## 2021-01-25 PROCEDURE — 86480 TB TEST CELL IMMUN MEASURE: CPT

## 2021-01-27 LAB
GAMMA INTERFERON BACKGROUND BLD IA-ACNC: 0.02 IU/ML
M TB IFN-G CD4+ BCKGRND COR BLD-ACNC: 0 IU/ML
MITOGEN IGNF BCKGRD COR BLD-ACNC: 9.65 IU/ML
TB GOLD PLUS: NEGATIVE
TB2 - NIL: 0 IU/ML

## 2021-03-02 ENCOUNTER — TELEPHONE (OUTPATIENT)
Dept: FAMILY MEDICINE | Facility: CLINIC | Age: 76
End: 2021-03-02